# Patient Record
Sex: FEMALE | Race: BLACK OR AFRICAN AMERICAN | NOT HISPANIC OR LATINO | Employment: OTHER | URBAN - METROPOLITAN AREA
[De-identification: names, ages, dates, MRNs, and addresses within clinical notes are randomized per-mention and may not be internally consistent; named-entity substitution may affect disease eponyms.]

---

## 2017-01-11 ENCOUNTER — TELEPHONE (OUTPATIENT)
Dept: ORTHOPEDICS | Facility: CLINIC | Age: 52
End: 2017-01-11

## 2017-01-11 NOTE — TELEPHONE ENCOUNTER
Called patient moved appointment to [place]. On Friday, denied appointment with Dr. Reyes, due to proximity. Patient vocalized understanding.

## 2017-01-12 DIAGNOSIS — M25.562 ACUTE PAIN OF LEFT KNEE: Primary | ICD-10-CM

## 2017-01-13 ENCOUNTER — HOSPITAL ENCOUNTER (OUTPATIENT)
Dept: RADIOLOGY | Facility: HOSPITAL | Age: 52
Discharge: HOME | End: 2017-01-13
Attending: ORTHOPAEDIC SURGERY
Payer: MEDICARE

## 2017-01-13 ENCOUNTER — OFFICE VISIT (OUTPATIENT)
Dept: ORTHOPEDICS | Facility: CLINIC | Age: 52
End: 2017-01-13
Payer: MEDICARE

## 2017-01-13 VITALS
HEART RATE: 75 BPM | DIASTOLIC BLOOD PRESSURE: 81 MMHG | SYSTOLIC BLOOD PRESSURE: 128 MMHG | WEIGHT: 212.06 LBS | BODY MASS INDEX: 37.57 KG/M2 | RESPIRATION RATE: 16 BRPM | HEIGHT: 63 IN

## 2017-01-13 DIAGNOSIS — M25.562 CHRONIC PAIN OF LEFT KNEE: Primary | ICD-10-CM

## 2017-01-13 DIAGNOSIS — M25.562 ACUTE PAIN OF LEFT KNEE: ICD-10-CM

## 2017-01-13 DIAGNOSIS — G89.29 CHRONIC PAIN OF LEFT KNEE: Primary | ICD-10-CM

## 2017-01-13 PROCEDURE — 99999 PR PBB SHADOW E&M-EST. PATIENT-LVL IV: CPT | Mod: PBBFAC,,, | Performed by: PHYSICIAN ASSISTANT

## 2017-01-13 PROCEDURE — 99213 OFFICE O/P EST LOW 20 MIN: CPT | Mod: S$GLB,,, | Performed by: PHYSICIAN ASSISTANT

## 2017-01-13 PROCEDURE — 73562 X-RAY EXAM OF KNEE 3: CPT | Mod: 26,LT,, | Performed by: RADIOLOGY

## 2017-01-13 PROCEDURE — 1159F MED LIST DOCD IN RCRD: CPT | Mod: S$GLB,,, | Performed by: PHYSICIAN ASSISTANT

## 2017-01-13 RX ORDER — MELOXICAM 7.5 MG/1
7.5 TABLET ORAL DAILY PRN
COMMUNITY
Start: 2017-01-04 | End: 2017-11-20 | Stop reason: SDUPTHER

## 2017-01-13 RX ORDER — CYANOCOBALAMIN 1000 UG/ML
INJECTION, SOLUTION INTRAMUSCULAR; SUBCUTANEOUS
COMMUNITY
Start: 2016-11-02

## 2017-01-13 NOTE — PATIENT INSTRUCTIONS
En qué consiste real lesión de la médula hopson (LME)   Real lesión de la médula hopson (LME) puede reducir la sensación y el movimiento en ciertas partes del cuerpo. Cada LME es diferente, y el efecto también es distinto en cada persona. En esta hoja se describen algunos detalles básicos sobre los distintos niveles y grados de estas lesiones. Consulte con york médico para obtener más información sobre york jeffy particular.    Niveles de real lesión de la médula hopson  La columna vertebral tiene 33 vértebras agrupadas en cuatro secciones:  · Vértebras cervicales (C): En el lauryn  · Vértebras torácicas (T): En la parte superior de la espalda  · Vértebras lumbares (L): En la parte inferior de la espalda  · Vértebras sacras (S): En la pelvis  La médula hopson recorre el centro de las vértebras desde la base del cerebro hasta la parte inferior de la espalda y contiene muchos nervios que controlan el movimiento y la sensación en el cuerpo. Los nervios entran y salen de la médula hopson a través de los espacios entre las vértebras. Cada sección tiene el siguiente número de nervios:  · 8 nervios cervicales: C1 a C8  · 12 nervios torácicos: T1 a T12  · 5 nervios lumbares: L1 a L5  · 5 nervios sacros: S1 a S5  Real lesión de la médula hopson se identifica con la letra correspondiente a la sección donde se encuentra, y el número correspondiente al nervio lesionado. El nivel de la lesión determina qué partes del cuerpo están afectadas. En general, el movimiento y la sensación se pierden por debajo del nivel de la lesión.  Clasificación de la gravedad de la lesión  Real LME se considera completa cuando existe real pérdida total de movimiento y sensación por debajo del nivel de la lesión. Real LME se considera incompleta cuando existe real pérdida parcial de movimiento o sensación por debajo del nivel de la lesión. Para evaluar la gravedad de york lesión, el médico puede usar la escala de la MELLO (American Spinal Injury  Association). (El cuadro al final de madhavi folleto contiene un enlace al sitio de la JAIR.) En esta escala se asigna real letra (de la A a la E) para identificar el paulo de gravedad de la lesión. Madhavi paulo se basa en los resultados de varias pruebas de la función sensorial y motora. York médico podrá darle más información sobre el paulo de york lesión y la frecuencia con que deberán hacerle pruebas médicas.  Lo que puede esperar  Real persona con real LME tendrá distintos grados de sensación y movimiento en york cuerpo según cuál sea el nivel de gravedad de york lesión. Otras funciones corporales manny la respiración y el control de la vejiga también pueden verse afectadas. Algunas personas con real LME pueden ser capaces de realizar la mayoría de york cuidado personal y actividades cotidianas por sí solas, mientras que otras pueden necesitar ayuda con ciertas tareas manny vestirse, comer, manejar y moverse.  Cada persona es diferente  Recuerde que el efecto de estas lesiones varía de real persona a otra, aun cuando tengan el mismo nivel de lesión, porque depende de muchos factores. El paulo y severidad de la lesión, así manny las capacidades del paciente, pueden cambiar con el tiempo. Después del período de adaptación inicial, muchas personas con todo tipo de LME logran tener real buena calidad de iman. Hable con york médico y con york equipo de atención médica para informarse sobre los efectos que puede esperar de york lesión y establecer los objetivos más apropiados.  Recursos  · American Spinal Injury Association (JAIR)  www.jair-spinalinjury.org/publications/n_store.php  · The National Spinal Cord Injury Foundation  www.spinalcord.org  · [employer] Foundation  www.christopherreeve.org  · [employer] [place]  www.pva.org  · Spinal Cord Injury Information Network  www.spinalcord.[facility].edu   © 2003-9616 [employer]. [address] [place] [address], [place] [address]. Todos los [facility]. Esta  información no pretende sustituir la atención médica profesional. Sólo york médico puede diagnosticar y tratar un problema de sandra.        Osteoarthritis  Osteoarthritis (also called degenerative joint disease) happens when the cartilage in a joint becomes damaged and worn. This may be due to age, wear and tear, overuse of the joint, or other problems. Osteoarthritis can affect any joint. But it is most common in hands, knees, spine, hips, and feet. Symptoms include joint stiffness, pain, and swelling.  Home care  · When a joint is more sore than usual, rest it for a day or two.  · Heat can help relieve stiffness. Take a hot bath or apply a heating pad for up to 30 minutes at a time. If symptoms are worse in the morning, using heat just after awakening can help relax the muscle and soothe the joints.   · Ice helps relieve pain and swelling. It is often used after activity. Use a cold pack wrapped in a thin cloth on the joint for 10-15 minutes at a time.   · Alternating hot and cold can also help relieve pain. Try this for 20 minutes at a time, several times per day.  · Exercise helps prevent the muscles and ligaments around the joint from becoming weak. It also helps maintain function in the joint.  Be as active as you can. Talk to your doctor about what activity program is best for you.  · Excess weight puts a lot of extra strain on weight-bearing joints of the lower back, hips, knees, feet and ankles. If you are overweight, talk to your doctor about a safe and effective weight loss program.  · Use anti-inflammatory medications as prescribed for pain. This incudes acetaminophen or NSAIDs such as ibuprofen or naproxen. If needed, topical or injected medications may be recommended. Talk to your doctor if these options are not enough to manage your pain.  · Talk with your doctor about devices that might help improve your function and reduce pain.  Follow-up care  Follow up with your doctor as advised by our  staff.  When to seek medical attention  Call your doctor right away if any of the following occur:  · Redness or swelling of a painful joint  · Discharge or pus from a painful joint  · Fever of 100.4ºF (38ºC) or higher, or as directed by your healthcare provider  · Worsening joint pain  · Decreased ability to move the joint or bear weight on the joint  © 0009-3797 [employer]. [address] [place], [place] [address]. All rights reserved. This information is not intended as a substitute for professional medical care. Always follow your healthcare professional's instructions.    CONSIDER TAKING TUMERIC FOR PAIN.

## 2017-01-13 NOTE — PROGRESS NOTES
CC:52 y/o female, left knee pain for 3-4 months, right hip-knee for same.  Date of Surgery: 5/8/2013 left knee ATS    HPI:52 y/o left pops, locks on her, increase pain after standing too long. Has tried NSAIDS, PT. Right hip and knne increase pain after long standing, flexes her back to reduce pain. Currently being seen for back, sciatic and drop foot issues. ROD- pending. Left knee starting to effect ADL's    PMH:    Past Medical History   Diagnosis Date    Acute angina     DDD (degenerative disc disease)     DVT (deep venous thrombosis)     Fibromyalgia     Foot drop     GERD (gastroesophageal reflux disease)     Hyperlipemia     Hypertension     Mitral valve prolapse     Sciatic nerve injury     Spondylolisthesis        PSH:    Past Surgical History   Procedure Laterality Date    Knee arthroscopy      Tubal ligation      Hysterectomy      Lipoma resection      Cardiac catheterization         Family Hx:    Family History   Problem Relation Age of Onset    Heart disease Mother     Diabetes Mother     Anesthesia problems Neg Hx     Broken bones Neg Hx     Cancer Neg Hx     Clotting disorder Neg Hx     Collagen disease Neg Hx     Dislocations Neg Hx     Osteoporosis Neg Hx     Rheumatologic disease Neg Hx     Scoliosis Neg Hx     Severe sprains Neg Hx        Allergy:    Review of patient's allergies indicates:   Allergen Reactions    Sulfa (sulfonamide antibiotics) Hives    Tylox [oxycodone-acetaminophen] Hives    Daypro [oxaprozin] Hives       Medication:    Current Outpatient Prescriptions:     atorvastatin (LIPITOR) 10 MG tablet, Take 10 mg by mouth once daily., Disp: , Rfl:     clopidogrel (PLAVIX) 75 mg tablet, , Disp: , Rfl:     cyanocobalamin 1,000 mcg/mL injection, , Disp: , Rfl:     cyclobenzaprine (FLEXERIL) 5 MG tablet, Take 5 mg by mouth 3 (three) times daily as needed., Disp: , Rfl:     gabapentin (NEURONTIN) 300 MG capsule, , Disp: , Rfl:     meloxicam (MOBIC) 7.5 MG  "tablet, , Disp: , Rfl:     metoprolol tartrate (LOPRESSOR) 50 MG tablet, , Disp: , Rfl:     mometasone (NASONEX) 50 mcg/actuation nasal spray, 2 sprays by Nasal route once daily., Disp: , Rfl:     nitroGLYCERIN (NITROSTAT) 0.4 MG SL tablet, Place 0.4 mg under the tongue every 5 (five) minutes as needed., Disp: , Rfl:     tramadol (ULTRAM) 50 mg tablet, , Disp: , Rfl:     nabumetone (RELAFEN) 500 MG tablet, Take 1 tablet (500 mg total) by mouth 2 (two) times daily., Disp: 30 tablet, Rfl: 0    Social History:    Social History     Social History    Marital status:      Spouse name: N/A    Number of children: N/A    Years of education: N/A     Occupational History    Unemployed      Social History Main Topics    Smoking status: Never Smoker    Smokeless tobacco: Not on file    Alcohol use No    Drug use: No    Sexual activity: Not on file     Other Topics Concern    Not on file     Social History Narrative       Vitals:     Visit Vitals    /81 (BP Location: Right arm, Patient Position: Sitting, BP Method: Automatic)    Pulse 75    Resp 16    Ht 5' 3" (1.6 m)    Wt 96.2 kg (212 lb 1.3 oz)    BMI 37.57 kg/m2        ROS:  GENERAL: No fever, chills, fatigability or weight loss.  SKIN: No rashes, itching or changes in color or texture of skin.  HEAD: No headaches or recent head trauma.  EYES: Visual acuity fine. No photophobia, ocular pain or diplopia.  EARS: Denies ear pain, discharge or vertigo.  NOSE: No loss of smell, no epistaxis or postnasal drip.  MOUTH & THROAT: No hoarseness or change in voice. No excessive gum bleeding.  NODES: Denies swollen glands.  CHEST: Denies CUBA, cyanosis, wheezing, cough and sputum production.  CARDIOVASCULAR: Denies chest pain, PND, orthopnea or reduced exercise tolerance.  ABDOMEN: Appetite fine. No weight loss. Denies diarrhea, abdominal pain, hematemesis or blood in stool.  URINARY: No flank pain, dysuria or hematuria.  PERIPHERAL VASCULAR: No " claudication or cyanosis.  NEUROLOGIC: No history of seizures, paralysis, alteration of gait or coordination.  MUSCULOSKELETAL: See HPI    PE:  APPEARANCE: Well nourished, well developed, in no acute distress.   HEAD: Normocephalic, atraumatic.  SKIN:no redness or signs of infection.  NEUROLOGIC: Cranial Nerves: II-XII grossly intact, also see MUSCULOSKELETAL  MUSCULOSKELETAL: increase bi-lateral crepitus, left increase tender joint line, mild effusion, increase pain with extension.  Right hip- tender along bursa, down lateral thigh.  Sensory intact, cap refill less than 2 sec.    Assessment:           Diagnosis:              1.Left knee pain ( hx of surgery)               2. Right knee pain                   Diagnostic Studies  MRI-Yes, left knee, increase pain, hx of surgery  X-Ray-Yes, There is no radiographic evidence of acute osseous, articular, or soft tissue abnormality. Mild joint space loss present at the patellofemoral compartment with minimal osteophyte production.  EMG/NCV-No  Arthrogram-No  Bone Scan-No  CT Scan-No  Doppler-No  ESR-No  CRP-No  CBC with Diff-No   Rheumatoid/Arthritis Panel-No      Plan:                                                 1. PT-no                                                 2.OT-no                                          3.NSAID-yes   Continue Mobic                                     4. Narcotics-no                                     5. Wound care-N/A                                 6. Rest-no                                           7. Surgery-no                                         8. KOURTNEY Hose-no                                    9. Anticoagulation therapy-no               10. Elevation-no                                     11. Crutches-no                                    12. Walker-no             13. Cane no                        14. Referral-no                                     15.Injection-no                            16. Splint   /    Cast   /   Cast  Shoe-No              17. RICE            18. Follow up-   After MRI. MRI for left knee pain, failed better with NSAIDS and PT. Continue to follow up for possible spinal stenosis and sciatic issues as this could be causing most of her problems

## 2017-01-13 NOTE — MR AVS SNAPSHOT
[facility]  [address] [facility] [address]  Phone: [phone]  Fax: [phone]                  Suze FRIED Julio   2017 9:00 AM   Office Visit    Description:  Female : 1965   Provider:  Immanuel Estrada PA-C   Department:  [facility]           Reason for Visit     Right Knee - Pain     Left Knee - Pain           Diagnoses this Visit        Comments    Chronic pain of left knee    -  Primary            To Do List           Future Appointments        Provider Department Dept Phone    2017 9:30 AM [facility] [phone]    2/10/2017 9:15 AM Vincent Reyes Sr., MD [facility] [phone]      Goals (5 Years of Data)     None      Follow-Up and Disposition     Return for MRI Results.      [facility]     [facility] -  Assistance  Registered nurses in the [facility] provide clinical advisement, health education, appointment booking, and other advisory services.  Call for this free service at [phone].             Medications           Message regarding Medications     Verify the changes and/or additions to your medication regime listed below are the same as discussed with your clinician today.  If any of these changes or additions are incorrect, please notify your healthcare provider.        STOP taking these medications     hydrocodone-acetaminophen 7.5-325mg (NORCO) 7.5-325 mg per tablet Take 1 tablet by mouth every 6 (six) hours as needed.           Verify that the below list of medications is an accurate representation of the medications you are currently taking.  If none reported, the list may be blank. If incorrect, please contact your healthcare provider. Carry this list with you in case of emergency.           Current Medications     atorvastatin (LIPITOR) 10 MG tablet Take 10 mg by mouth once daily.    clopidogrel (PLAVIX) 75 mg tablet     cyanocobalamin 1,000 mcg/mL  "injection     cyclobenzaprine (FLEXERIL) 5 MG tablet Take 5 mg by mouth 3 (three) times daily as needed.    gabapentin (NEURONTIN) 300 MG capsule     meloxicam (MOBIC) 7.5 MG tablet     metoprolol tartrate (LOPRESSOR) 50 MG tablet     mometasone (NASONEX) 50 mcg/actuation nasal spray 2 sprays by Nasal route once daily.    nabumetone (RELAFEN) 500 MG tablet Take 1 tablet (500 mg total) by mouth 2 (two) times daily.    nitroGLYCERIN (NITROSTAT) 0.4 MG SL tablet Place 0.4 mg under the tongue every 5 (five) minutes as needed.    tramadol (ULTRAM) 50 mg tablet            Clinical Reference Information           Vital Signs - Last Recorded  Most recent update: 1/13/2017  9:44 AM by Ana Velazquez MA    BP Pulse Resp Ht Wt BMI    128/81 (BP Location: Right arm, Patient Position: Sitting, BP Method: Automatic) 75 16 5' 3" (1.6 m) 96.2 kg (212 lb 1.3 oz) 37.57 kg/m2      Blood Pressure          Most Recent Value    BP  128/81      Allergies as of 1/13/2017     Sulfa (Sulfonamide Antibiotics)    Tylox [Oxycodone-acetaminophen]    Daypro [Oxaprozin]      Immunizations Administered on Date of Encounter - 1/13/2017     None      Orders Placed During Today's Visit     Future Labs/Procedures Expected by Expires    MRI Lower Extremity Joint WO Cont Left  1/13/2017 1/13/2018      Instructions      En qué consiste real lesión de la médula hopson (LME)   Real lesión de la médula hopson (LME) puede reducir la sensación y el movimiento en ciertas partes del cuerpo. Cada LME es diferente, y el efecto también es distinto en cada persona. En esta hoja se describen algunos detalles básicos sobre los distintos niveles y grados de estas lesiones. Consulte con york médico para obtener más información sobre york jeffy particular.    Niveles de real lesión de la médula hopson  La columna vertebral tiene 33 vértebras agrupadas en cuatro secciones:  · Vértebras cervicales (C): En el lauryn  · Vértebras torácicas (T): En la parte superior de la " espalda  · Vértebras lumbares (L): En la parte inferior de la espalda  · Vértebras sacras (S): En la pelvis  La médula hopson recorre el centro de las vértebras desde la base del cerebro hasta la parte inferior de la espalda y contiene muchos nervios que controlan el movimiento y la sensación en el cuerpo. Los nervios entran y salen de la médula hopson a través de los espacios entre las vértebras. Cada sección tiene el siguiente número de nervios:  · 8 nervios cervicales: C1 a C8  · 12 nervios torácicos: T1 a T12  · 5 nervios lumbares: L1 a L5  · 5 nervios sacros: S1 a S5  Real lesión de la médula hopson se identifica con la letra correspondiente a la sección donde se encuentra, y el número correspondiente al nervio lesionado. El nivel de la lesión determina qué partes del cuerpo están afectadas. En general, el movimiento y la sensación se pierden por debajo del nivel de la lesión.  Clasificación de la gravedad de la lesión  Real LME se considera completa cuando existe real pérdida total de movimiento y sensación por debajo del nivel de la lesión. Real LME se considera incompleta cuando existe real pérdida parcial de movimiento o sensación por debajo del nivel de la lesión. Para evaluar la gravedad de york lesión, el médico puede usar la escala de la MELLO (American Spinal Injury Association). (El cuadro al final de madhavi folleto contiene un enlace al sitio de la MELLO.) En esta escala se asigna real letra (de la A a la E) para identificar el paulo de gravedad de la lesión. Madhavi paulo se basa en los resultados de varias pruebas de la función sensorial y motora. [place] médico podrá darle más información sobre el paulo de york lesión y la frecuencia con que deberán hacerle pruebas médicas.  Lo que puede esperar  Real persona con real LME tendrá distintos grados de sensación y movimiento en york cuerpo según cuál sea el nivel de gravedad de york lesión. Otras funciones corporales manny la respiración y el control de la vejiga también  pueden verse afectadas. Algunas personas con real LME pueden ser capaces de realizar la mayoría de york cuidado personal y actividades cotidianas por sí solas, mientras que otras pueden necesitar ayuda con ciertas tareas manny vestirse, comer, manejar y moverse.  Cada persona es diferente  Recuerde que el efecto de estas lesiones varía de real persona a otra, aun cuando tengan el mismo nivel de lesión, porque depende de muchos factores. El paulo y severidad de la lesión, así manny las capacidades del paciente, pueden cambiar con el tiempo. Después del período de adaptación inicial, muchas personas con todo tipo de LME logran tener real buena calidad de iman. Hable con york médico y con york equipo de atención médica para informarse sobre los efectos que puede esperar de york lesión y establecer los objetivos más apropiados.  Recursos  · American Spinal Injury Association (JAIR)  www.jair-spinalinjury.org/publications/n_store.php  · The National Spinal Cord Injury Foundation  www.spinalcord.org  · [employer]eve Foundation  www.christopherreeve.org  · [employer] [place]  www.pva.org  · Spinal Cord Injury Information Network  www.spinalcord.b.edu   © [id number] [employer]. [address] [place] [address], [place] [address]. Todos los derechos reservados. Esta información no pretende sustituir la atención médica profesional. Sólo york médico puede diagnosticar y tratar un problema de sandra.        Osteoarthritis  Osteoarthritis (also called degenerative joint disease) happens when the cartilage in a joint becomes damaged and worn. This may be due to age, wear and tear, overuse of the joint, or other problems. Osteoarthritis can affect any joint. But it is most common in hands, knees, spine, hips, and feet. Symptoms include joint stiffness, pain, and swelling.  Home care  · When a joint is more sore than usual, rest it for a day or two.  · Heat can help relieve stiffness. Take a hot bath or apply a  heating pad for up to 30 minutes at a time. If symptoms are worse in the morning, using heat just after awakening can help relax the muscle and soothe the joints.   · Ice helps relieve pain and swelling. It is often used after activity. Use a cold pack wrapped in a thin cloth on the joint for 10-15 minutes at a time.   · Alternating hot and cold can also help relieve pain. Try this for 20 minutes at a time, several times per day.  · Exercise helps prevent the muscles and ligaments around the joint from becoming weak. It also helps maintain function in the joint.  Be as active as you can. Talk to your doctor about what activity program is best for you.  · Excess weight puts a lot of extra strain on weight-bearing joints of the lower back, hips, knees, feet and ankles. If you are overweight, talk to your doctor about a safe and effective weight loss program.  · Use anti-inflammatory medications as prescribed for pain. This incudes acetaminophen or NSAIDs such as ibuprofen or naproxen. If needed, topical or injected medications may be recommended. Talk to your doctor if these options are not enough to manage your pain.  · Talk with your doctor about devices that might help improve your function and reduce pain.  Follow-up care  Follow up with your doctor as advised by our staff.  When to seek medical attention  Call your doctor right away if any of the following occur:  · Redness or swelling of a painful joint  · Discharge or pus from a painful joint  · Fever of 100.4ºF (38ºC) or higher, or as directed by your healthcare provider  · Worsening joint pain  · Decreased ability to move the joint or bear weight on the joint  © 0102-2760 The [employer], [employer]. [address] [place] [address], [place] [address]. All rights reserved. This information is not intended as a substitute for professional medical care. Always follow your healthcare professional's instructions.    CONSIDER TAKING TUMERIC FOR PAIN.

## 2017-01-27 ENCOUNTER — HOSPITAL ENCOUNTER (OUTPATIENT)
Dept: RADIOLOGY | Facility: HOSPITAL | Age: 52
Discharge: HOME | End: 2017-01-27
Attending: ORTHOPAEDIC SURGERY
Payer: MEDICARE

## 2017-01-27 DIAGNOSIS — M25.562 CHRONIC PAIN OF LEFT KNEE: ICD-10-CM

## 2017-01-27 DIAGNOSIS — G89.29 CHRONIC PAIN OF LEFT KNEE: ICD-10-CM

## 2017-01-27 PROCEDURE — 73721 MRI JNT OF LWR EXTRE W/O DYE: CPT | Mod: 26,LT,, | Performed by: RADIOLOGY

## 2017-01-27 PROCEDURE — 73721 MRI JNT OF LWR EXTRE W/O DYE: CPT | Mod: TC,PO,LT

## 2017-02-09 ENCOUNTER — TELEPHONE (OUTPATIENT)
Dept: ORTHOPEDICS | Facility: CLINIC | Age: 52
End: 2017-02-09

## 2017-02-09 NOTE — TELEPHONE ENCOUNTER
Called patient, moved appointment to Immanuel Estrada PA-C for MRI review on 02/14/2017. Dr. Reyes will not be available. Patient in agreement and vocalized understanding.

## 2017-02-14 ENCOUNTER — OFFICE VISIT (OUTPATIENT)
Dept: ORTHOPEDICS | Facility: CLINIC | Age: 52
End: 2017-02-14
Payer: MEDICARE

## 2017-02-14 VITALS
WEIGHT: 208.56 LBS | SYSTOLIC BLOOD PRESSURE: 116 MMHG | HEART RATE: 62 BPM | BODY MASS INDEX: 36.95 KG/M2 | DIASTOLIC BLOOD PRESSURE: 77 MMHG | HEIGHT: 63 IN

## 2017-02-14 DIAGNOSIS — M25.562 ACUTE PAIN OF LEFT KNEE: Primary | ICD-10-CM

## 2017-02-14 PROCEDURE — 99999 PR PBB SHADOW E&M-EST. PATIENT-LVL III: CPT | Mod: PBBFAC,,, | Performed by: PHYSICIAN ASSISTANT

## 2017-02-14 PROCEDURE — 99213 OFFICE O/P EST LOW 20 MIN: CPT | Mod: S$GLB,,, | Performed by: PHYSICIAN ASSISTANT

## 2017-02-14 RX ORDER — OSELTAMIVIR PHOSPHATE 75 MG
CAPSULE ORAL
Refills: 0 | COMMUNITY
Start: 2017-02-11 | End: 2017-04-21

## 2017-02-14 RX ORDER — CHLOPHEDIANOL HYDROCHLORIDE, DEXBROMPHENIRAMINE MALEATE, PSEUDOEPHEDRINE HYDROCHLORIDE 12.5; 1; 3 MG/5ML; MG/5ML; MG/5ML
LIQUID ORAL
Refills: 0 | COMMUNITY
Start: 2017-02-11 | End: 2017-05-08

## 2017-02-14 NOTE — MR AVS SNAPSHOT
[facility]  [address] [facility] [place] [place] [address]  Phone: [phone]  Fax: [phone]                  Suze FRIED Julio   2017 11:15 AM   Office Visit    Description:  Female : 1965   Provider:  Immanuel Estrada PA-C   Department:  [facility]           Reason for Visit     Left Knee - Pain     Results           Diagnoses this Visit        Comments    Acute pain of left knee    -  Primary            To Do List           Future Appointments        Provider Department Dept Phone    2017 8:30 [phone]    2017 8:45 AM [facility] XR2 [facility] [phone]    2017 9:00 AM SPECIMEN, [facility] [phone]    2017 9:15 AM LABORATORY, [facility] [phone]    2017 9:45 AM ORTHOPEDICS NURSE, [facility] [phone]      Goals (5 Years of Data)     None      Follow-Up and Disposition     Return if symptoms worsen or fail to improve.    Follow-up and Disposition History      [facility]     [facility] -  Assistance  Registered nurses in the [facility] provide clinical advisement, health education, appointment booking, and other advisory services.  Call for this free service at [phone].             Medications           Message regarding Medications     Verify the changes and/or additions to your medication regime listed below are the same as discussed with your clinician today.  If any of these changes or additions are incorrect, please notify your healthcare provider.             Verify that the below list of medications is an accurate representation of the medications you are currently taking.  If none reported, the list may be blank. If incorrect, please contact your healthcare provider. Carry this list with you in case of emergency.           Current Medications     atorvastatin (LIPITOR) 10 MG  "tablet Take 10 mg by mouth once daily.    CHLO TUSS 1-30-12.5 mg/5 mL Liqd TK 5 ML PO QID PRF COUGH/CONGESTION    clopidogrel (PLAVIX) 75 mg tablet     cyanocobalamin 1,000 mcg/mL injection     cyclobenzaprine (FLEXERIL) 5 MG tablet Take 5 mg by mouth 3 (three) times daily as needed.    gabapentin (NEURONTIN) 300 MG capsule     meloxicam (MOBIC) 7.5 MG tablet     metoprolol tartrate (LOPRESSOR) 50 MG tablet     mometasone (NASONEX) 50 mcg/actuation nasal spray 2 sprays by Nasal route once daily.    nabumetone (RELAFEN) 500 MG tablet Take 1 tablet (500 mg total) by mouth 2 (two) times daily.    nitroGLYCERIN (NITROSTAT) 0.4 MG SL tablet Place 0.4 mg under the tongue every 5 (five) minutes as needed.    TAMIFLU 75 mg capsule TK 1 C PO BID FOR 5 DAYS    tramadol (ULTRAM) 50 mg tablet            Clinical Reference Information           Your Vitals Were     BP Pulse Height Weight BMI    116/77 62 5' 3" (1.6 m) 94.6 kg (208 lb 8.9 oz) 36.94 kg/m2      Blood Pressure          Most Recent Value    BP  116/77      Allergies as of 2/14/2017     Sulfa (Sulfonamide Antibiotics)    Tylox [Oxycodone-acetaminophen]    Daypro [Oxaprozin]      Immunizations Administered on Date of Encounter - 2/14/2017     None      Instructions      Knee Arthroscopy  Knee problems can often be diagnosed and treated with a technique called arthroscopy. This type of surgery is done using an instrument called an arthroscope (scope). Only a few small incisions are needed for this surgery. The procedure can be used to diagnose a knee problem. In many cases, treatment can also be done using arthroscopy.     Insertion of fluid, arthroscope, and instruments through small incisions (portals).         Patient undergoes procedure      The arthroscope  The scope allows the doctor to look directly into the knee joint. It is about the size of a pencil and contains a pathway for fluids. It also contains coated glass fibers that beam an intense, cool light into " the knee joint. A camera is attached to the scope as well. It provides clear images of most areas in your knee joint. The doctor views these images on a monitor.  Preparing for the procedure  · Have lab or other testing done as advised.  · Tell your doctor about any medicines or supplements you take  · Do not eat or drink anything for 10 hours before the procedure.  · Once you arrive for surgery, you will be given an IV line in your arm or hand. This provides fluids and medicines.  · To keep you free of pain during the surgery, youll receive medicine called anesthesia. You may have:  ¨ General anesthesia. This puts you into a deep sleep during the surgery.  ¨ Regional anesthesia. This numbs the body from the waist down.  ¨ Local anesthesia. This numbs just the knee.  In addition to regional or local anesthesia, you may receive sedation. This medicine makes you relaxed and sleepy during the surgery.  The procedure  · A few small incisions (portals) are made in your knee.  · The scope is inserted through one of the portals.  · Sterile fluid is put into the knee joint. This makes it easier to see and work inside your joint.  · Using the scope, the doctor confirms the type and degree of knee damage. If possible, the problem is treated at this time. This is done using surgical tools put through the other portals.  · When the surgery is done, all tools are removed. The incisions are closed with sutures, staples, surgical glue, or strips of surgical tape.  Risks and complications of arthroscopy  All surgeries have risks. The risks of arthroscopy include:  · Bleeding  · Infection  · Blood clots  · Swelling and stiffness of the knee  · Injury to normal tissue  · Continuing knee problems   Date Last Reviewed: 9/20/2015 © 2000-2016 [employer]. [address] [place] [address]. All rights reserved. This information is not intended as a substitute for professional medical care. Always follow your  healthcare professional's instructions.             Language Assistance Services     ATTENTION: Language assistance services are available, free of charge. Please call [phone].      ATENCIÓN: Si habla louise, tiene a york disposición servicios gratuitos de asistencia lingüística. Llame al [phone].     CHÚ Ý: N?u b?n nói Ti?ng Vi?t, có các d?ch v? h? tr? ngôn ng? mi?n phí dành cho b?n. G?i s? [phone].         [facility] complies with applicable Federal civil rights laws and does not discriminate on the basis of race, color, national origin, age, disability, or sex.

## 2017-02-14 NOTE — PATIENT INSTRUCTIONS
Knee Arthroscopy  Knee problems can often be diagnosed and treated with a technique called arthroscopy. This type of surgery is done using an instrument called an arthroscope (scope). Only a few small incisions are needed for this surgery. The procedure can be used to diagnose a knee problem. In many cases, treatment can also be done using arthroscopy.     Insertion of fluid, arthroscope, and instruments through small incisions (portals).         Patient undergoes procedure      The arthroscope  The scope allows the doctor to look directly into the knee joint. It is about the size of a pencil and contains a pathway for fluids. It also contains coated glass fibers that beam an intense, cool light into the knee joint. A camera is attached to the scope as well. It provides clear images of most areas in your knee joint. The doctor views these images on a monitor.  Preparing for the procedure  · Have lab or other testing done as advised.  · Tell your doctor about any medicines or supplements you take  · Do not eat or drink anything for 10 hours before the procedure.  · Once you arrive for surgery, you will be given an IV line in your arm or hand. This provides fluids and medicines.  · To keep you free of pain during the surgery, youll receive medicine called anesthesia. You may have:  ¨ General anesthesia. This puts you into a deep sleep during the surgery.  ¨ Regional anesthesia. This numbs the body from the waist down.  ¨ Local anesthesia. This numbs just the knee.  In addition to regional or local anesthesia, you may receive sedation. This medicine makes you relaxed and sleepy during the surgery.  The procedure  · A few small incisions (portals) are made in your knee.  · The scope is inserted through one of the portals.  · Sterile fluid is put into the knee joint. This makes it easier to see and work inside your joint.  · Using the scope, the doctor confirms the type and degree of knee damage. If possible, the  problem is treated at this time. This is done using surgical tools put through the other portals.  · When the surgery is done, all tools are removed. The incisions are closed with sutures, staples, surgical glue, or strips of surgical tape.  Risks and complications of arthroscopy  All surgeries have risks. The risks of arthroscopy include:  · Bleeding  · Infection  · Blood clots  · Swelling and stiffness of the knee  · Injury to normal tissue  · Continuing knee problems   Date Last Reviewed: 9/20/2015 © 2000-2016 [employer]. [address] [place] [address], [place] [address]. All rights reserved. This information is not intended as a substitute for professional medical care. Always follow your healthcare professional's instructions.

## 2017-02-14 NOTE — PROGRESS NOTES
CC:52 y/o female, left knee pain for 3-4 months, right hip-knee for same.    Date of Surgery: 5/8/2013 left knee ATS    HPI:52 y/o left pops, locks on her, increase pain after standing too long. Has tried NSAIDS, PT. Right hip and knne increase pain after long standing, flexes her back to reduce pain. Currently being seen for back, sciatic and drop foot issues. ROD- pending. Left knee starting to effect ADL's    PMH:    Past Medical History   Diagnosis Date    Acute angina     DDD (degenerative disc disease)     DVT (deep venous thrombosis)     Fibromyalgia     Foot drop     GERD (gastroesophageal reflux disease)     Hyperlipemia     Hypertension     Mitral valve prolapse     Sciatic nerve injury     Spondylolisthesis        PSH:    Past Surgical History   Procedure Laterality Date    Knee arthroscopy      Tubal ligation      Hysterectomy      Lipoma resection      Cardiac catheterization         Family Hx:    Family History   Problem Relation Age of Onset    Heart disease Mother     Diabetes Mother     Anesthesia problems Neg Hx     Broken bones Neg Hx     Cancer Neg Hx     Clotting disorder Neg Hx     Collagen disease Neg Hx     Dislocations Neg Hx     Osteoporosis Neg Hx     Rheumatologic disease Neg Hx     Scoliosis Neg Hx     Severe sprains Neg Hx        Allergy:    Review of patient's allergies indicates:   Allergen Reactions    Sulfa (sulfonamide antibiotics) Hives    Tylox [oxycodone-acetaminophen] Hives    Daypro [oxaprozin] Hives       Medication:    Current Outpatient Prescriptions:     atorvastatin (LIPITOR) 10 MG tablet, Take 10 mg by mouth once daily., Disp: , Rfl:     CHLO TUSS 1-30-12.5 mg/5 mL Liqd, TK 5 ML PO QID PRF COUGH/CONGESTION, Disp: , Rfl: 0    clopidogrel (PLAVIX) 75 mg tablet, , Disp: , Rfl:     cyanocobalamin 1,000 mcg/mL injection, , Disp: , Rfl:     cyclobenzaprine (FLEXERIL) 5 MG tablet, Take 5 mg by mouth 3 (three) times daily as needed., Disp: ,  "Rfl:     gabapentin (NEURONTIN) 300 MG capsule, , Disp: , Rfl:     meloxicam (MOBIC) 7.5 MG tablet, , Disp: , Rfl:     metoprolol tartrate (LOPRESSOR) 50 MG tablet, , Disp: , Rfl:     mometasone (NASONEX) 50 mcg/actuation nasal spray, 2 sprays by Nasal route once daily., Disp: , Rfl:     nabumetone (RELAFEN) 500 MG tablet, Take 1 tablet (500 mg total) by mouth 2 (two) times daily., Disp: 30 tablet, Rfl: 0    nitroGLYCERIN (NITROSTAT) 0.4 MG SL tablet, Place 0.4 mg under the tongue every 5 (five) minutes as needed., Disp: , Rfl:     TAMIFLU 75 mg capsule, TK 1 C PO BID FOR 5 DAYS, Disp: , Rfl: 0    tramadol (ULTRAM) 50 mg tablet, , Disp: , Rfl:     Social History:    Social History     Social History    Marital status:      Spouse name: N/A    Number of children: N/A    Years of education: N/A     Occupational History    Unemployed      Social History Main Topics    Smoking status: Never Smoker    Smokeless tobacco: Never Used    Alcohol use No    Drug use: No    Sexual activity: Not on file     Other Topics Concern    Not on file     Social History Narrative       Vitals:     Visit Vitals    /77    Pulse 62    Ht 5' 3" (1.6 m)    Wt 94.6 kg (208 lb 8.9 oz)    BMI 36.94 kg/m2        ROS:  GENERAL: No fever, chills, fatigability or weight loss.  SKIN: No rashes, itching or changes in color or texture of skin.  HEAD: No headaches or recent head trauma.  EYES: Visual acuity fine. No photophobia, ocular pain or diplopia.  EARS: Denies ear pain, discharge or vertigo.  NOSE: No loss of smell, no epistaxis or postnasal drip.  MOUTH & THROAT: No hoarseness or change in voice. No excessive gum bleeding.  NODES: Denies swollen glands.  CHEST: Denies CUBA, cyanosis, wheezing, cough and sputum production.  CARDIOVASCULAR: Denies chest pain, PND, orthopnea or reduced exercise tolerance.  ABDOMEN: Appetite fine. No weight loss. Denies diarrhea, abdominal pain, hematemesis or blood in " stool.  URINARY: No flank pain, dysuria or hematuria.  PERIPHERAL VASCULAR: No claudication or cyanosis.  NEUROLOGIC: No history of seizures, paralysis, alteration of gait or coordination.  MUSCULOSKELETAL: See HPI    PE:  APPEARANCE: Well nourished, well developed, in no acute distress.   HEAD: Normocephalic, atraumatic.  SKIN:no redness or signs of infection.  NEUROLOGIC: Cranial Nerves: II-XII grossly intact, also see MUSCULOSKELETAL  MUSCULOSKELETAL: increase bi-lateral crepitus, left increase tender joint line, mild effusion, increase pain with extension.  Right hip- tender along bursa, down lateral thigh.  Sensory intact, cap refill less than 2 sec.    Assessment:           Diagnosis:              1.Left knee pain ( hx of surgery)               2. Right knee pain                   Diagnostic Studies  MRI-Yes,agree with the findings of Degenerative change in the patellofemoral joint and medial tibiofemoral joint. Subchondral cyst formation in the lateral aspect medial femoral condyle. Clinically exclude early osteochondritis dissecans.    X-Ray-Yes, There is no radiographic evidence of acute osseous, articular, or soft tissue abnormality. Mild joint space loss present at the patellofemoral compartment with minimal osteophyte production.  EMG/NCV-No  Arthrogram-No  Bone Scan-No  CT Scan-No  Doppler-No  ESR-No  CRP-No  CBC with Diff-No   Rheumatoid/Arthritis Panel-No      Plan:                                                 1. PT-no                                                 2.OT-no                                          3.NSAID-yes   Continue Mobic                                     4. Narcotics-no                                     5. Wound care-N/A                                 6. Rest-no                                           7. Surgery- left knee ATS                                       8. KOURTNEY Hose-no                                    9. Anticoagulation therapy-no               10.  Elevation-no                                     11. Crutches-no                                    12. Walker-no             13. Cane no                        14. Referral-no                                     15.Injection-no                            16. Splint   /    Cast   /   Cast Shoe-No              17. RICE            18. Follow up-   all the patient's questions and concerns were answered by myself and Dr Reyes.  Patient wishes to proceed with a left knee arthroscope and decreased pain, increased range of motion and improved quality of life.  She also wear that she still may have some pain after the procedure.

## 2017-02-15 DIAGNOSIS — M25.562 CHRONIC PAIN OF LEFT KNEE: Primary | ICD-10-CM

## 2017-02-15 DIAGNOSIS — G89.29 CHRONIC PAIN OF LEFT KNEE: Primary | ICD-10-CM

## 2017-02-15 DIAGNOSIS — Z01.818 PRE-OP TESTING: ICD-10-CM

## 2017-04-17 ENCOUNTER — CLINICAL SUPPORT (OUTPATIENT)
Dept: CARDIOLOGY | Facility: CLINIC | Age: 52
End: 2017-04-17
Payer: MEDICARE

## 2017-04-17 ENCOUNTER — HOSPITAL ENCOUNTER (OUTPATIENT)
Dept: RADIOLOGY | Facility: HOSPITAL | Age: 52
Discharge: HOME | End: 2017-04-17
Attending: ORTHOPAEDIC SURGERY
Payer: MEDICARE

## 2017-04-17 DIAGNOSIS — Z01.818 PRE-OP TESTING: ICD-10-CM

## 2017-04-17 PROCEDURE — 93000 ELECTROCARDIOGRAM COMPLETE: CPT | Mod: S$GLB,,, | Performed by: NUCLEAR MEDICINE

## 2017-04-17 PROCEDURE — 71020 XR CHEST PA AND LATERAL PRE-OP: CPT | Mod: 26,,, | Performed by: RADIOLOGY

## 2017-04-21 ENCOUNTER — OFFICE VISIT (OUTPATIENT)
Dept: INTERNAL MEDICINE | Facility: CLINIC | Age: 52
End: 2017-04-21
Payer: MEDICARE

## 2017-04-21 VITALS
HEART RATE: 82 BPM | HEIGHT: 63 IN | SYSTOLIC BLOOD PRESSURE: 120 MMHG | DIASTOLIC BLOOD PRESSURE: 80 MMHG | BODY MASS INDEX: 36.75 KG/M2 | TEMPERATURE: 97 F | WEIGHT: 207.44 LBS

## 2017-04-21 DIAGNOSIS — G57.02 SCIATIC NERVE DISEASE, LEFT: ICD-10-CM

## 2017-04-21 DIAGNOSIS — Z98.890 HISTORY OF LEFT KNEE SURGERY: ICD-10-CM

## 2017-04-21 DIAGNOSIS — M47.26 OSTEOARTHRITIS OF SPINE WITH RADICULOPATHY, LUMBAR REGION: ICD-10-CM

## 2017-04-21 DIAGNOSIS — M17.12 ARTHRITIS OF LEFT KNEE: ICD-10-CM

## 2017-04-21 DIAGNOSIS — Z01.818 PRE-OP EXAM: Primary | ICD-10-CM

## 2017-04-21 DIAGNOSIS — M25.562 ACUTE PAIN OF LEFT KNEE: ICD-10-CM

## 2017-04-21 PROCEDURE — 99214 OFFICE O/P EST MOD 30 MIN: CPT | Mod: S$GLB,,, | Performed by: NURSE PRACTITIONER

## 2017-04-21 PROCEDURE — 99999 PR PBB SHADOW E&M-EST. PATIENT-LVL III: CPT | Mod: PBBFAC,,, | Performed by: NURSE PRACTITIONER

## 2017-04-21 PROCEDURE — 1160F RVW MEDS BY RX/DR IN RCRD: CPT | Mod: S$GLB,,, | Performed by: NURSE PRACTITIONER

## 2017-04-21 RX ORDER — LISINOPRIL 10 MG/1
10 TABLET ORAL NIGHTLY
COMMUNITY
End: 2017-10-11 | Stop reason: SDUPTHER

## 2017-04-21 RX ORDER — TRAMADOL HYDROCHLORIDE 50 MG/1
50 TABLET ORAL EVERY 8 HOURS PRN
Qty: 30 TABLET | Refills: 0 | Status: SHIPPED | OUTPATIENT
Start: 2017-04-21

## 2017-04-21 NOTE — PROGRESS NOTES
Subjective:       Patient ID: Suze Kim is a 51 y.o. female.    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 Reyes wants to stop plavix   She denies fever, sweats, chills, rash, sob, cp, headaches, blurred vision, n/v/d, dizziness, or any other acute complaints.  HTN- controlled        Past Surgical History:  No date: CARDIAC CATHETERIZATION  No date: HYSTERECTOMY  No date: KNEE ARTHROSCOPY  No date: LIPOMA RESECTION  No date: TUBAL LIGATION        Past Medical History:  No date: Acute angina--sees Dr. Fajardo- will be seen by him next week before surgery  No date: DDD (degenerative disc disease)  No date: DVT (deep venous thrombosis)  No date: Fibromyalgia  No date: Foot drop  No date: GERD (gastroesophageal reflux disease)  No date: Hyperlipemia  No date: Hypertension  No date: Mitral valve prolapse  No date: Sciatic nerve injury  No date: Spondylolisthesis      Current Outpatient Prescriptions:     lisinopril 10 MG tablet, Take 10 mg by mouth once daily., Disp: , Rfl:     OMEGA-3S/DHA/EPA/FISH OIL/D3 (VITAMIN-D + OMEGA-3 ORAL), Take by mouth., Disp: , Rfl:     atorvastatin (LIPITOR) 10 MG tablet, Take 10 mg by mouth once daily., Disp: , Rfl:     CHLO TUSS 1-30-12.5 mg/5 mL Liqd, TK 5 ML PO QID PRF COUGH/CONGESTION, Disp: , Rfl: 0    clopidogrel (PLAVIX) 75 mg tablet, , Disp: , Rfl:     cyanocobalamin 1,000 mcg/mL injection, , Disp: , Rfl:     cyclobenzaprine (FLEXERIL) 5 MG tablet, Take 5 mg by mouth 3 (three) times daily as needed., Disp: , Rfl:     gabapentin (NEURONTIN) 300 MG capsule, , Disp: , Rfl:     meloxicam (MOBIC) 7.5 MG tablet, , Disp: , Rfl:     metoprolol tartrate (LOPRESSOR) 50 MG tablet, , Disp: , Rfl:     mometasone (NASONEX) 50  mcg/actuation nasal spray, 2 sprays by Nasal route once daily., Disp: , Rfl:     nabumetone (RELAFEN) 500 MG tablet, Take 1 tablet (500 mg total) by mouth 2 (two) times daily., Disp: 30 tablet, Rfl: 0    nitroGLYCERIN (NITROSTAT) 0.4 MG SL tablet, Place 0.4 mg under the tongue every 5 (five) minutes as needed., Disp: , Rfl:     tramadol (ULTRAM) 50 mg tablet, Take 1 tablet (50 mg total) by mouth every 8 (eight) hours as needed for Pain., Disp: 30 tablet, Rfl: 0      Medication Refill   Associated symptoms include arthralgias (left knee pain). Pertinent negatives include no abdominal pain, chest pain, chills, congestion, coughing, fatigue, fever, headaches, nausea, sore throat or vomiting.     Review of Systems   Constitutional: Negative for chills, fatigue and fever.   HENT: Negative for congestion, ear pain, postnasal drip, rhinorrhea, sinus pressure, sneezing and sore throat.    Eyes: Negative for photophobia, pain and visual disturbance.   Respiratory: Negative for cough, chest tightness and shortness of breath.    Cardiovascular: Negative for chest pain, palpitations and leg swelling.   Gastrointestinal: Negative for abdominal pain, constipation, diarrhea, nausea and vomiting.   Genitourinary: Negative for dysuria and frequency.   Musculoskeletal: Positive for arthralgias (left knee pain) and back pain.        Ambulates with cane    Neurological: Negative for dizziness, light-headedness and headaches.   Psychiatric/Behavioral: Negative for sleep disturbance.       Objective:      Physical Exam   Constitutional: She is oriented to person, place, and time. She appears well-developed and well-nourished.   HENT:   Head: Normocephalic and atraumatic.   Right Ear: Tympanic membrane normal.   Left Ear: Tympanic membrane normal.   Eyes: Conjunctivae and EOM are normal. Pupils are equal, round, and reactive to light.   Neck: Normal range of motion. Neck supple.   Cardiovascular: Normal rate, regular rhythm, normal  heart sounds and intact distal pulses.    Pulmonary/Chest: Effort normal and breath sounds normal.   Abdominal: Soft. Bowel sounds are normal.   Musculoskeletal:        Left knee: She exhibits decreased range of motion. She exhibits no swelling.        Lumbar back: She exhibits decreased range of motion and pain.   Neurological: She is alert and oriented to person, place, and time.   Skin: Skin is warm and dry.   Psychiatric: She has a normal mood and affect.       Assessment:       1. Pre-op exam    2. Acute pain of left knee    3. Arthritis of left knee    4. History of left knee surgery    5. Osteoarthritis of spine with radiculopathy, lumbar region    6. Sciatic nerve disease, left        Plan:   Pre-op exam    Acute pain of left knee  Comments:  acute on chronic  Orders:  -     tramadol (ULTRAM) 50 mg tablet; Take 1 tablet (50 mg total) by mouth every 8 (eight) hours as needed for Pain.  Dispense: 30 tablet; Refill: 0    Arthritis of left knee    History of left knee surgery    Osteoarthritis of spine with radiculopathy, lumbar region    Sciatic nerve disease, left      Pt is cleared from internal medicine perspective for low risk of pre-operative complications.   Will see Cardiology next for cardiac clearance  Stop plavix according to Dr. Reyes preference  A few Tramadol given until surgery

## 2017-04-21 NOTE — LETTER
April 21, 2017      Vincent Reyes Sr., MD  [address] [place] [address]           [facility]  [address] [place] [address]  Phone: [phone]  Fax: [phone]          Patient: Suze Kim   MR Number: [id number]   YOB: 1965   Date of Visit: 4/21/2017       Dear Dr. Vincent Reyes Sr.:    Thank you for referring Suze Kim to me for evaluation. Attached you will find relevant portions of my assessment and plan of care.    If you have questions, please do not hesitate to call me. I look forward to following Suze Kim along with you.    Sincerely,    Dione Cordero, HANK    Enclosure  CC:  No Recipients    If you would like to receive this communication electronically, please contact [email or web address] or [phone] to request more information on [employer] access.    For providers and/or their staff who would like to refer a patient to [facility], please contact us through our one-stop-shop provider referral line, [facility], at [phone].    If you feel you have received this communication in error or would no longer like to receive these types of communications, please e-mail [email or web address]

## 2017-04-21 NOTE — PATIENT INSTRUCTIONS
Pt is cleared from internal medicine perspective for low risk of pre-operative complications.   Will see Cardiology next for cardiac clearance  Stop plavix according to Dr. Reyes preference

## 2017-04-21 NOTE — MR AVS SNAPSHOT
[facility] Medicine  [address] [facility] [address]  Phone: [phone]  Fax: [phone]                  Suze FRIED Julio   2017 1:00 PM   Office Visit    Description:  Female : 1965   Provider:  Dione Cordero NP   Department:  [facility] - Internal Medicine           Reason for Visit     Pre-op Exam     Medication Refill           Diagnoses this Visit        Comments    Pre-op exam    -  Primary     Acute pain of left knee     acute on chronic    Arthritis of left knee         History of left knee surgery         Osteoarthritis of spine with radiculopathy, lumbar region         Sciatic nerve disease, left                To Do List           Future Appointments        Provider Department Dept Phone    2017 2:15 PM Vincent Reyes Sr., MD [facility] [phone]      Your Future Surgeries/Procedures     May 10, 2017   Surgery with Vincent Reyes Sr., MD   [facility]  ([facility])    [address] [facility] [address] [phone]              Goals (5 Years of Data)     None       These Medications        Disp Refills Start End    tramadol (ULTRAM) 50 mg tablet 30 tablet 0 2017     Take 1 tablet (50 mg total) by mouth every 8 (eight) hours as needed for Pain. - Oral    Pharmacy: [facility] [address] [place] [address] Ph #: [phone]         [facility]     [facility] Care [facility] -  Assistance  Unless otherwise directed by your provider, please contact [facility], our nurse care line that is available for / assistance.     Registered nurses in the [facility] provide: appointment scheduling, clinical advisement, health education, and other advisory services.  Call: [phone] (toll free)               Medications           Message regarding Medications     Verify the changes and/or additions to your medication regime listed below are  "the same as discussed with your clinician today.  If any of these changes or additions are incorrect, please notify your healthcare provider.        CHANGE how you are taking these medications     Start Taking Instead of    tramadol (ULTRAM) 50 mg tablet tramadol (ULTRAM) 50 mg tablet    Dosage:  Take 1 tablet (50 mg total) by mouth every 8 (eight) hours as needed for Pain.     Reason for Change:  Reorder       STOP taking these medications     TAMIFLU 75 mg capsule TK 1 C PO BID FOR 5 DAYS           Verify that the below list of medications is an accurate representation of the medications you are currently taking.  If none reported, the list may be blank. If incorrect, please contact your healthcare provider. Carry this list with you in case of emergency.           Current Medications     lisinopril 10 MG tablet Take 10 mg by mouth once daily.    OMEGA-3S/DHA/EPA/FISH OIL/D3 (VITAMIN-D + OMEGA-3 ORAL) Take by mouth.    atorvastatin (LIPITOR) 10 MG tablet Take 10 mg by mouth once daily.    CHLO TUSS 1-30-12.5 mg/5 mL Liqd TK 5 ML PO QID PRF COUGH/CONGESTION    clopidogrel (PLAVIX) 75 mg tablet     cyanocobalamin 1,000 mcg/mL injection     cyclobenzaprine (FLEXERIL) 5 MG tablet Take 5 mg by mouth 3 (three) times daily as needed.    gabapentin (NEURONTIN) 300 MG capsule     meloxicam (MOBIC) 7.5 MG tablet     metoprolol tartrate (LOPRESSOR) 50 MG tablet     mometasone (NASONEX) 50 mcg/actuation nasal spray 2 sprays by Nasal route once daily.    nabumetone (RELAFEN) 500 MG tablet Take 1 tablet (500 mg total) by mouth 2 (two) times daily.    nitroGLYCERIN (NITROSTAT) 0.4 MG SL tablet Place 0.4 mg under the tongue every 5 (five) minutes as needed.    tramadol (ULTRAM) 50 mg tablet Take 1 tablet (50 mg total) by mouth every 8 (eight) hours as needed for Pain.           Clinical Reference Information           Your Vitals Were     BP Pulse Temp Height Weight BMI    120/80 82 96.9 °F (36.1 °C) (Tympanic) 5' 3" (1.6 m) 94.1 " kg (207 lb 7.3 oz) 36.75 kg/m2      Blood Pressure          Most Recent Value    BP  120/80      Allergies as of 4/21/2017     Sulfa (Sulfonamide Antibiotics)    Tylox [Oxycodone-acetaminophen]    Daypro [Oxaprozin]      Immunizations Administered on Date of Encounter - 4/21/2017     None      Instructions      Pt is cleared from internal medicine perspective for low risk of pre-operative complications.   Will see Cardiology next for cardiac clearance  Stop plavix according to Dr. Reyes preference       Language Assistance Services     ATTENTION: Language assistance services are available, free of charge. Please call [phone].      ATENCIÓN: Si habla español, tiene a [place] disposición servicios gratuitos de asistencia lingüística. Llame al [phone].     CHÚ Ý: N?u b?n nói Ti?ng Vi?t, có các d?ch v? h? tr? ngôn ng? mi?n phí dành cho b?n. G?i s? [phone].         [facility] complies with applicable Federal civil rights laws and does not discriminate on the basis of race, color, national origin, age, disability, or sex.

## 2017-04-27 DIAGNOSIS — Z98.890 POST-OPERATIVE STATE: Primary | ICD-10-CM

## 2017-05-09 ENCOUNTER — ANESTHESIA EVENT (OUTPATIENT)
Dept: SURGERY | Facility: HOSPITAL | Age: 52
End: 2017-05-09
Payer: MEDICARE

## 2017-05-10 ENCOUNTER — TELEPHONE (OUTPATIENT)
Dept: ORTHOPEDICS | Facility: CLINIC | Age: 52
End: 2017-05-10

## 2017-05-10 ENCOUNTER — ANESTHESIA (OUTPATIENT)
Dept: SURGERY | Facility: HOSPITAL | Age: 52
End: 2017-05-10
Payer: MEDICARE

## 2017-05-10 ENCOUNTER — HOSPITAL ENCOUNTER (OUTPATIENT)
Facility: HOSPITAL | Age: 52
Discharge: HOME | End: 2017-05-10
Attending: ORTHOPAEDIC SURGERY | Admitting: ORTHOPAEDIC SURGERY
Payer: MEDICARE

## 2017-05-10 VITALS
WEIGHT: 207.88 LBS | DIASTOLIC BLOOD PRESSURE: 88 MMHG | OXYGEN SATURATION: 98 % | BODY MASS INDEX: 35.49 KG/M2 | TEMPERATURE: 98 F | HEART RATE: 64 BPM | HEIGHT: 64 IN | SYSTOLIC BLOOD PRESSURE: 136 MMHG | RESPIRATION RATE: 19 BRPM

## 2017-05-10 DIAGNOSIS — M94.261 CHONDROMALACIA OF RIGHT KNEE: ICD-10-CM

## 2017-05-10 DIAGNOSIS — G89.18 POST-OP PAIN: ICD-10-CM

## 2017-05-10 DIAGNOSIS — Z98.890 HISTORY OF LEFT KNEE SURGERY: Primary | ICD-10-CM

## 2017-05-10 DIAGNOSIS — M17.12 ARTHRITIS OF LEFT KNEE: ICD-10-CM

## 2017-05-10 DIAGNOSIS — M67.361 TRANSIENT SYNOVITIS OF RIGHT KNEE: ICD-10-CM

## 2017-05-10 PROCEDURE — 37000009 HC ANESTHESIA EA ADD 15 MINS: Performed by: ORTHOPAEDIC SURGERY

## 2017-05-10 PROCEDURE — 36000711: Performed by: ORTHOPAEDIC SURGERY

## 2017-05-10 PROCEDURE — 25000003 PHARM REV CODE 250: Performed by: ANESTHESIOLOGY

## 2017-05-10 PROCEDURE — 25000003 PHARM REV CODE 250: Performed by: NURSE ANESTHETIST, CERTIFIED REGISTERED

## 2017-05-10 PROCEDURE — 29880 ARTHRS KNE SRG MNISECTMY M&L: CPT | Mod: LT,,, | Performed by: ORTHOPAEDIC SURGERY

## 2017-05-10 PROCEDURE — 63600175 PHARM REV CODE 636 W HCPCS: Performed by: NURSE ANESTHETIST, CERTIFIED REGISTERED

## 2017-05-10 PROCEDURE — 27201423 OPTIME MED/SURG SUP & DEVICES STERILE SUPPLY: Performed by: ORTHOPAEDIC SURGERY

## 2017-05-10 PROCEDURE — 36000710: Performed by: ORTHOPAEDIC SURGERY

## 2017-05-10 PROCEDURE — 71000039 HC RECOVERY, EACH ADD'L HOUR: Performed by: ORTHOPAEDIC SURGERY

## 2017-05-10 PROCEDURE — 29880 ARTHRS KNE SRG MNISECTMY M&L: CPT | Mod: AS,LT,, | Performed by: PHYSICIAN ASSISTANT

## 2017-05-10 PROCEDURE — 63600175 PHARM REV CODE 636 W HCPCS: Performed by: ANESTHESIOLOGY

## 2017-05-10 PROCEDURE — 37000008 HC ANESTHESIA 1ST 15 MINUTES: Performed by: ORTHOPAEDIC SURGERY

## 2017-05-10 PROCEDURE — 25000003 PHARM REV CODE 250: Performed by: ORTHOPAEDIC SURGERY

## 2017-05-10 PROCEDURE — 63600175 PHARM REV CODE 636 W HCPCS: Performed by: ORTHOPAEDIC SURGERY

## 2017-05-10 PROCEDURE — 71000015 HC POSTOP RECOV 1ST HR: Performed by: ORTHOPAEDIC SURGERY

## 2017-05-10 PROCEDURE — 71000033 HC RECOVERY, INTIAL HOUR: Performed by: ORTHOPAEDIC SURGERY

## 2017-05-10 RX ORDER — PROMETHAZINE HYDROCHLORIDE 25 MG/ML
6.25 INJECTION, SOLUTION INTRAMUSCULAR; INTRAVENOUS EVERY 6 HOURS PRN
Status: DISCONTINUED | OUTPATIENT
Start: 2017-05-10 | End: 2017-05-10 | Stop reason: HOSPADM

## 2017-05-10 RX ORDER — ONDANSETRON 2 MG/ML
4 INJECTION INTRAMUSCULAR; INTRAVENOUS DAILY PRN
Status: DISCONTINUED | OUTPATIENT
Start: 2017-05-10 | End: 2017-05-10 | Stop reason: HOSPADM

## 2017-05-10 RX ORDER — SODIUM CHLORIDE, SODIUM LACTATE, POTASSIUM CHLORIDE, CALCIUM CHLORIDE 600; 310; 30; 20 MG/100ML; MG/100ML; MG/100ML; MG/100ML
INJECTION, SOLUTION INTRAVENOUS CONTINUOUS
Status: DISCONTINUED | OUTPATIENT
Start: 2017-05-10 | End: 2017-05-10 | Stop reason: HOSPADM

## 2017-05-10 RX ORDER — FENTANYL CITRATE 50 UG/ML
INJECTION, SOLUTION INTRAMUSCULAR; INTRAVENOUS
Status: DISCONTINUED | OUTPATIENT
Start: 2017-05-10 | End: 2017-05-10

## 2017-05-10 RX ORDER — LIDOCAINE HYDROCHLORIDE 20 MG/ML
JELLY TOPICAL
Status: DISCONTINUED | OUTPATIENT
Start: 2017-05-10 | End: 2017-05-10

## 2017-05-10 RX ORDER — CEFAZOLIN SODIUM 1 G/3ML
INJECTION, POWDER, FOR SOLUTION INTRAMUSCULAR; INTRAVENOUS
Status: DISCONTINUED | OUTPATIENT
Start: 2017-05-10 | End: 2017-05-10

## 2017-05-10 RX ORDER — MIDAZOLAM HYDROCHLORIDE 1 MG/ML
INJECTION, SOLUTION INTRAMUSCULAR; INTRAVENOUS
Status: DISCONTINUED | OUTPATIENT
Start: 2017-05-10 | End: 2017-05-10

## 2017-05-10 RX ORDER — MORPHINE SULFATE 10 MG/ML
2 INJECTION INTRAMUSCULAR; INTRAVENOUS; SUBCUTANEOUS EVERY 5 MIN PRN
Status: DISCONTINUED | OUTPATIENT
Start: 2017-05-10 | End: 2017-05-10 | Stop reason: HOSPADM

## 2017-05-10 RX ORDER — DIPHENHYDRAMINE HYDROCHLORIDE 50 MG/ML
25 INJECTION INTRAMUSCULAR; INTRAVENOUS EVERY 6 HOURS PRN
Status: DISCONTINUED | OUTPATIENT
Start: 2017-05-10 | End: 2017-05-10 | Stop reason: HOSPADM

## 2017-05-10 RX ORDER — HYDROCODONE BITARTRATE AND ACETAMINOPHEN 5; 325 MG/1; MG/1
1 TABLET ORAL EVERY 4 HOURS PRN
Status: DISCONTINUED | OUTPATIENT
Start: 2017-05-10 | End: 2017-05-10 | Stop reason: HOSPADM

## 2017-05-10 RX ORDER — SUCCINYLCHOLINE CHLORIDE 20 MG/ML
INJECTION INTRAMUSCULAR; INTRAVENOUS
Status: DISCONTINUED | OUTPATIENT
Start: 2017-05-10 | End: 2017-05-10

## 2017-05-10 RX ORDER — ROCURONIUM BROMIDE 10 MG/ML
INJECTION, SOLUTION INTRAVENOUS
Status: DISCONTINUED | OUTPATIENT
Start: 2017-05-10 | End: 2017-05-10

## 2017-05-10 RX ORDER — HYDROMORPHONE HYDROCHLORIDE 2 MG/ML
0.2 INJECTION, SOLUTION INTRAMUSCULAR; INTRAVENOUS; SUBCUTANEOUS EVERY 5 MIN PRN
Status: DISCONTINUED | OUTPATIENT
Start: 2017-05-10 | End: 2017-05-10 | Stop reason: HOSPADM

## 2017-05-10 RX ORDER — PROPOFOL 10 MG/ML
VIAL (ML) INTRAVENOUS
Status: DISCONTINUED | OUTPATIENT
Start: 2017-05-10 | End: 2017-05-10

## 2017-05-10 RX ORDER — BUPIVACAINE HYDROCHLORIDE 2.5 MG/ML
INJECTION, SOLUTION EPIDURAL; INFILTRATION; INTRACAUDAL
Status: DISCONTINUED | OUTPATIENT
Start: 2017-05-10 | End: 2017-05-10 | Stop reason: HOSPADM

## 2017-05-10 RX ORDER — ONDANSETRON 2 MG/ML
INJECTION INTRAMUSCULAR; INTRAVENOUS
Status: DISCONTINUED | OUTPATIENT
Start: 2017-05-10 | End: 2017-05-10

## 2017-05-10 RX ORDER — CEFAZOLIN SODIUM 1 G/50ML
2 SOLUTION INTRAVENOUS
Status: DISCONTINUED | OUTPATIENT
Start: 2017-05-10 | End: 2017-05-10 | Stop reason: HOSPADM

## 2017-05-10 RX ORDER — SODIUM CHLORIDE 9 MG/ML
3 INJECTION, SOLUTION INTRAMUSCULAR; INTRAVENOUS; SUBCUTANEOUS
Status: DISCONTINUED | OUTPATIENT
Start: 2017-05-10 | End: 2017-05-10 | Stop reason: HOSPADM

## 2017-05-10 RX ORDER — MEPERIDINE HYDROCHLORIDE 50 MG/ML
12.5 INJECTION INTRAMUSCULAR; INTRAVENOUS; SUBCUTANEOUS ONCE AS NEEDED
Status: COMPLETED | OUTPATIENT
Start: 2017-05-10 | End: 2017-05-10

## 2017-05-10 RX ORDER — MUPIROCIN 20 MG/G
1 OINTMENT TOPICAL
Status: DISCONTINUED | OUTPATIENT
Start: 2017-05-10 | End: 2017-05-10 | Stop reason: HOSPADM

## 2017-05-10 RX ORDER — LIDOCAINE HYDROCHLORIDE 20 MG/ML
INJECTION, SOLUTION EPIDURAL; INFILTRATION; INTRACAUDAL; PERINEURAL
Status: DISCONTINUED | OUTPATIENT
Start: 2017-05-10 | End: 2017-05-10

## 2017-05-10 RX ORDER — MEPERIDINE HYDROCHLORIDE 50 MG/1
50 TABLET ORAL EVERY 4 HOURS PRN
Qty: 60 TABLET | Refills: 0 | Status: SHIPPED | OUTPATIENT
Start: 2017-05-10 | End: 2018-01-30

## 2017-05-10 RX ORDER — KETOROLAC TROMETHAMINE 30 MG/ML
INJECTION, SOLUTION INTRAMUSCULAR; INTRAVENOUS
Status: DISCONTINUED | OUTPATIENT
Start: 2017-05-10 | End: 2017-05-10

## 2017-05-10 RX ORDER — LIDOCAINE HYDROCHLORIDE 10 MG/ML
INJECTION, SOLUTION EPIDURAL; INFILTRATION; INTRACAUDAL; PERINEURAL
Status: DISCONTINUED | OUTPATIENT
Start: 2017-05-10 | End: 2017-05-10 | Stop reason: HOSPADM

## 2017-05-10 RX ADMIN — MEPERIDINE HYDROCHLORIDE 12.5 MG: 50 INJECTION INTRAMUSCULAR; INTRAVENOUS; SUBCUTANEOUS at 11:05

## 2017-05-10 RX ADMIN — LIDOCAINE HYDROCHLORIDE 40 MG: 20 INJECTION, SOLUTION EPIDURAL; INFILTRATION; INTRACAUDAL; PERINEURAL at 09:05

## 2017-05-10 RX ADMIN — PROPOFOL 150 MG: 10 INJECTION, EMULSION INTRAVENOUS at 09:05

## 2017-05-10 RX ADMIN — KETOROLAC TROMETHAMINE 30 MG: 30 INJECTION, SOLUTION INTRAMUSCULAR; INTRAVENOUS at 10:05

## 2017-05-10 RX ADMIN — MORPHINE SULFATE 2 MG: 10 INJECTION, SOLUTION INTRAMUSCULAR; INTRAVENOUS at 11:05

## 2017-05-10 RX ADMIN — EPHEDRINE SULFATE 20 MG: 50 INJECTION, SOLUTION INTRAMUSCULAR; INTRAVENOUS; SUBCUTANEOUS at 09:05

## 2017-05-10 RX ADMIN — EPHEDRINE SULFATE 10 MG: 50 INJECTION, SOLUTION INTRAMUSCULAR; INTRAVENOUS; SUBCUTANEOUS at 09:05

## 2017-05-10 RX ADMIN — CEFAZOLIN SODIUM 1 G: 1 SOLUTION INTRAVENOUS at 09:05

## 2017-05-10 RX ADMIN — HYDROMORPHONE HYDROCHLORIDE 0.2 MG: 2 INJECTION, SOLUTION INTRAMUSCULAR; INTRAVENOUS; SUBCUTANEOUS at 11:05

## 2017-05-10 RX ADMIN — MORPHINE SULFATE 2 MG: 10 INJECTION, SOLUTION INTRAMUSCULAR; INTRAVENOUS at 12:05

## 2017-05-10 RX ADMIN — HYDROCODONE BITARTRATE AND ACETAMINOPHEN 1 TABLET: 5; 325 TABLET ORAL at 11:05

## 2017-05-10 RX ADMIN — ONDANSETRON 4 MG: 2 INJECTION INTRAMUSCULAR; INTRAVENOUS at 11:05

## 2017-05-10 RX ADMIN — CEFAZOLIN 1 G: 1 INJECTION, POWDER, FOR SOLUTION INTRAMUSCULAR; INTRAVENOUS at 09:05

## 2017-05-10 RX ADMIN — ONDANSETRON 4 MG: 2 INJECTION, SOLUTION INTRAMUSCULAR; INTRAVENOUS at 10:05

## 2017-05-10 RX ADMIN — FENTANYL CITRATE 100 MCG: 50 INJECTION, SOLUTION INTRAMUSCULAR; INTRAVENOUS at 09:05

## 2017-05-10 RX ADMIN — ROCURONIUM BROMIDE 5 MG: 10 INJECTION, SOLUTION INTRAVENOUS at 09:05

## 2017-05-10 RX ADMIN — SODIUM CHLORIDE, SODIUM LACTATE, POTASSIUM CHLORIDE, AND CALCIUM CHLORIDE: 600; 310; 30; 20 INJECTION, SOLUTION INTRAVENOUS at 09:05

## 2017-05-10 RX ADMIN — MIDAZOLAM HYDROCHLORIDE 2 MG: 1 INJECTION, SOLUTION INTRAMUSCULAR; INTRAVENOUS at 09:05

## 2017-05-10 RX ADMIN — SODIUM CHLORIDE, SODIUM LACTATE, POTASSIUM CHLORIDE, AND CALCIUM CHLORIDE: 600; 310; 30; 20 INJECTION, SOLUTION INTRAVENOUS at 10:05

## 2017-05-10 RX ADMIN — SUCCINYLCHOLINE CHLORIDE 120 MG: 20 INJECTION, SOLUTION INTRAMUSCULAR; INTRAVENOUS at 09:05

## 2017-05-10 RX ADMIN — LIDOCAINE HYDROCHLORIDE 1 EACH: 20 JELLY TOPICAL at 09:05

## 2017-05-10 NOTE — IP AVS SNAPSHOT
[address] [place] [address]           Patient Discharge Instructions   Our goal is to set you up for success. This packet includes information on your condition, medications, and your home care.  It will help you care for yourself to prevent having to return to the hospital.     Please ask your nurse if you have any questions.      There are many details to remember when preparing to leave the hospital. Here is what you will need to do:    1. Take your medicine. If you are prescribed medications, review your Medication List on the following pages. You may have new medications to  at the pharmacy and others that you'll need to stop taking. Review the instructions for how and when to take your medications. Talk with your doctor or nurses if you are unsure of what to do.     2. Go to your follow-up appointments. Specific follow-up information is listed in the following pages. Your may be contacted by a nurse or clinical provider about future appointments. Be sure we have all of the phone numbers to reach you. Please contact your provider's office if you are unable to make an appointment.     3. Watch for warning signs. Your doctor or nurse will give you detailed warning signs to watch for and when to call for assistance. These instructions may also include educational information about your condition. If you experience any of warning signs to your health, call your doctor.               ** Verify the list of medication(s) below is accurate and up to date. Carry this with you in case of emergency. If your medications have changed, please notify your healthcare provider.             Medication List      CONTINUE taking these medications        Additional Info                      atorvastatin 10 MG tablet   Commonly known as:  LIPITOR   Refills:  0   Dose:  10 mg    Instructions:  Take 10 mg by mouth once daily.     Begin Date    AM    Noon    PM    Bedtime        clopidogrel 75 mg tablet   Commonly known as:  PLAVIX   Refills:  0      Begin Date    AM    Noon    PM    Bedtime       cyanocobalamin 1,000 mcg/mL injection   Refills:  0   Indications:  twice a month      Begin Date    AM    Noon    PM    Bedtime       cyclobenzaprine 5 MG tablet   Commonly known as:  FLEXERIL   Refills:  0   Dose:  5 mg    Instructions:  Take 5 mg by mouth 3 (three) times daily as needed.     Begin Date    AM    Noon    PM    Bedtime       gabapentin 300 MG capsule   Commonly known as:  NEURONTIN   Refills:  0   Dose:  300 mg    Instructions:  300 mg 2 (two) times daily as needed.     Begin Date    AM    Noon    PM    Bedtime       lisinopril 10 MG tablet   Refills:  0   Dose:  10 mg    Instructions:  Take 10 mg by mouth every evening.     Begin Date    AM    Noon    PM    Bedtime       meloxicam 7.5 MG tablet   Commonly known as:  MOBIC   Refills:  0      Begin Date    AM    Noon    PM    Bedtime       metoprolol tartrate 50 MG tablet   Commonly known as:  LOPRESSOR   Refills:  0   Dose:  50 mg    Instructions:  50 mg 2 (two) times daily.     Begin Date    AM    Noon    PM    Bedtime       mometasone 50 mcg/actuation nasal spray   Commonly known as:  NASONEX   Refills:  0   Dose:  2 spray    Instructions:  2 sprays by Nasal route once daily.     Begin Date    AM    Noon    PM    Bedtime       nabumetone 500 MG tablet   Commonly known as:  RELAFEN   Quantity:  30 tablet   Refills:  0   Dose:  500 mg   Comments:  Patient has taken this medication before and did not have a reaction    Instructions:  Take 1 tablet (500 mg total) by mouth 2 (two) times daily.     Begin Date    AM    Noon    PM    Bedtime       nitroGLYCERIN 0.4 MG SL tablet   Commonly known as:  NITROSTAT   Refills:  0   Dose:  0.4 mg    Instructions:  Place 0.4 mg under the tongue every 5 (five) minutes as needed.     Begin Date    AM    Noon    PM    Bedtime       tramadol 50 mg tablet   Commonly known as:  ULTRAM   Quantity:  30  tablet   Refills:  0   Dose:  50 mg    Instructions:  Take 1 tablet (50 mg total) by mouth every 8 (eight) hours as needed for Pain.     Begin Date    AM    Noon    PM    Bedtime       VITAMIN-D + OMEGA-3 ORAL   Refills:  0    Instructions:  Take by mouth once a week.     Begin Date    AM    Noon    PM    Bedtime         ASK your doctor about these medications        Additional Info                      meperidine 50 mg tablet   Commonly known as:  DEMEROL   Quantity:  60 tablet   Refills:  0   Dose:  50 mg    Instructions:  Take 1 tablet (50 mg total) by mouth every 4 (four) hours as needed for Pain.     Begin Date    AM    Noon    PM    Bedtime            Where to Get Your Medications      You can get these medications from any pharmacy     Bring a paper prescription for each of these medications     meperidine 50 mg tablet                  Please bring to all follow up appointments:    1. A copy of your discharge instructions.  2. All medicines you are currently taking in their original bottles.  3. Identification and insurance card.    Please arrive 15 minutes ahead of scheduled appointment time.    Please call 24 hours in advance if you must reschedule your appointment and/or time.        Your Scheduled Appointments     May 15, 2017  9:00 AM CDT   New Physical Therapy Patient with CHARITY Mcleod   [facility]    [id number] [place] [address] Drive [address]  [facility] [address]   [phone]            May 23, 2017  2:15 PM CDT   Post OP with Vincent Reyes Sr., MD   [facility] ([facility])    [address] [facility] [place] [address] [phone]              Follow-up Information     Follow up with Vincent Reyes Sr, MD.    Specialty:  Orthopedic Surgery    Why:  As needed, If symptoms worsen, For suture removal, For wound re-check    Contact information:    [address]  [place] [address]  [phone]        Referrals     Future Orders    Referral to  Physical therapy     Questions:    Post Surgical?:  Yes    Eval and Treat:  Yes    Duration:  60 days    Frequency (times per week):  Three    Precautions:      Location:  Knees    OT Location:      Restore Functional ADL Training?:      Gait Training:      Therapeutic Exercises:  Passive    Active    Resistive    Wound Care:      Traction:      Electric Stimulation:  Yes    IONTO.:  No    U/S:  Yes    Developmental Stimulation?:      Specialty Programs:          Discharge Instructions     Future Orders    Activity as tolerated     Call MD for:  difficulty breathing, headache or visual disturbances     Call MD for:  extreme fatigue     Call MD for:  hives     Call MD for:  persistent dizziness or light-headedness     Call MD for:  persistent nausea and vomiting     Call MD for:  redness, tenderness, or signs of infection (pain, swelling, redness, odor or green/yellow discharge around incision site)     Call MD for:  severe uncontrolled pain     Call MD for:  temperature >100.4     Diet general     Questions:    Total calories:      Fat restriction, if any:      Protein restriction, if any:      Na restriction, if any:      Fluid restriction:      Additional restrictions:      Remove dressing in 48 hours     Scheduling Instructions:    Apply Betadine, gauze and an Ace wrap daily, after the first 48 hours.        Discharge Instructions         General Information:    1.  Do not drink alcoholic beverages including beer for 24 hours or as long as you are on pain medication..  2.  Do not drive a motor vehicle, operate machinery or power tools, or signs legal papers for 24 hours or as long as you are on pain medication.   3.  You may experience light-headedness, dizziness, and sleepiness following surgery. Please do not stay alone. A responsible adult should be with you for this 24 hour period.  4.  Go home and rest.    5. Progress slowly to a normal diet unless instructed.  Otherwise, begin with liquids such as soft  drinks, then soup and crackers working up to solid foods. Drink plenty of nonalcoholic fluids.  6.  Certain anesthetics and pain medications produce nausea and vomiting in certain       individuals. If nausea becomes a problem at home, call you doctor.    7. A nurse will be calling you sometime after surgery. Do not be alarmed. This is our way of finding out how you are doing.    8. Several times every hour while you are awake, take 2-3 deep breaths and cough. If you had stomach surgery hold a pillow or rolled towel firmly against your stomach before you cough. This will help with any pain the cough might cause.  9. Several times every hour while you are awake, pump and flex your feet 5-6 times and do foot circles. This will help prevent blood clots.    10.Call your doctor for severe pain, bleeding, fever, or signs or symptoms of infection (pain, swelling, redness, foul odor, drainage).    11.You can contact your doctor anytime by [phone] for the [facility] (at [facility]) or [phone] for the [facility] on [address].   [email or web address] is another way to contact your doctor if you are an active participant online with [facility].        Discharge Instructions for Knee Arthroscopy  You had knee arthroscopy. This surgical procedure uses small incisions to locate, identify, and treat problems inside the knee. These problems include loose bodies, meniscal tears, bone spurs, osteochondritis dissecans (OCD), and synovitis. Below are tips to help speed your recovery from surgery.  Activity  · Dont drive until your doctor says its OK. And never drive while taking opioid pain medicine.  · Remember to take pain medicines as directed; dont wait for the pain to get bad. And don't drink alcohol while taking pain medicines.  · Follow weight-bearing instructions given by your doctor. He or she may require you to use crutches to keep weight off your knee.  · Unless your doctor tells you otherwise,  begin using the affected knee as much as you can tolerate 3 days after surgery.  · Slowly bend and straighten your affected leg as far as you can, unless your doctor tells you otherwise. Do this several times a day.  · Rest your knee by lying down and putting pillows under it for the first 3 days after surgery. Keep your ankle elevated above the level of your heart. This helps keep swelling down.  · Follow your doctors instructions about wearing and caring for a brace, immobilizer, or elastic dressing.  · Point and flex your foot, and rotate your ankle as much as possible during the first few weeks following surgery. Also, wiggle your toes as much as possible.  Incision care  · Check your incision daily for redness, tenderness, or drainage.  · Dont be alarmed if there is some bruising, slight swelling of the knee, or a small amount of blood on the bandage.  · Adjust the bandage or brace as needed. It should feel supportive on your knee, but not too tight.   · Dont soak your incision in water (no hot tubs, bathtubs, swimming pools) until your doctor says its OK.  · Wait 2 day(s) after your surgery to begin showering. Then shower as needed. Cover your knee with plastic to keep the dressing or brace dry. Once your dressing is removed, follow your doctors instructions for care of the wound. And sit on a shower stool so that you dont fall while showering.  · Use an ice pack or bag of frozen peas--or something similar--wrapped in a thin towel to reduce the swelling. Keep the foot elevated while you ice the knee. Apply the ice pack for 20 minutes; then remove it for 20 minutes. Repeat as needed. Icing helps reduce swelling.  Other precautions  · Arrange your household to keep the items you need within reach.  · Remove throw rugs, electrical cords, and anything else that may cause you to fall.  · Use nonslip bath mats, grab bars, an elevated toilet seat, and a shower chair in your bathroom.  · Use a cane, crutches, a  walker, or handrails until your balance, flexibility, and strength improve, and you can put weight on your leg. And remember to ask for help from others when you need it.  · Free up your hands so that you can use them to keep balance. Use a jesus pack, apron, or pockets to carry things.  Follow-up  Make a follow-up appointment as directed by your doctor.     When to seek medical attention  Call 911 right away if you have any of the following:  · Chest pain  · Shortness of breath  · Severe nausea  Otherwise, call your doctor immediately if you have any of the following:  · Pain that is not relieved by medicine or rest  · Continued bleeding through the bandage  · Tingling, numbness, or coldness in your foot or leg  · Fever above 100.4°F (38.0°C) or shaking chills  · Excessive swelling, increased redness, or any drainage around the incision  · Swelling, tenderness, or pain in your leg      Date Last Reviewed: 11/16/2015  © 7291-0418 [employer]. [address] [place] [address]. All rights reserved. This information is not intended as a substitute for professional medical care. Always follow your healthcare professional's instructions.        Meperidine tablets  What is this medicine?  MEPERIDINE (me PER i dawna) is a pain reliever. It is used to treat moderate to severe pain.  How should I use this medicine?  Take this medicine by mouth with a full glass of water. Follow the directions on the prescription label. Take your medicine at regular intervals. Do not take your medicine more often than directed.  A special MedGuide will be given to you by the pharmacist with each prescription and refill. Be sure to read this information carefully each time.  Talk to your pediatrician regarding the use of this medicine in children. While this drug may be prescribed for selected conditions, precautions do apply.  What side effects may I notice from receiving this medicine?  Side effects that you should  report to your doctor or health care professional as soon as possible:  · allergic reactions like skin rash, itching or hives, swelling of the face, lips, or tongue  · breathing problems  · confusion  · seizures  · signs and symptoms of low blood pressure like dizziness; feeling faint or lightheaded, falls; unusually weak or tired  · trouble passing urine or change in the amount of urine  Side effects that usually do not require medical attention (report to your doctor or health care professional if they continue or are bothersome):  · constipation  · dry mouth  · nausea, vomiting  · tiredness  What may interact with this medicine?  Do not take this medicine with any of the following medications:  · MAOIs like Carbex, Eldepryl, Marplan, Nardil, and Parnate  · procarbazine  · ritonavir  This medicine may also interact with the following medications:  · acyclovir  · alcohol  · antihistamines for allergy, cough and cold  · certain medicines for anxiety or sleep  · certain medicines for depression like amitriptyline, fluoxetine, sertraline  · certain medicines for seizures like phenobarbital, phenytoin, primidone  · cimetidine  · furazolidone  · general anesthetics like halothane, isoflurane, methoxyflurane, propofol  · linezolid  · local anesthetics like lidocaine, pramoxine, tetracaine  · medicines that relax muscles for surgery  · other narcotic medicines for pain or cough  · phenothiazines like chlorpromazine, mesoridazine, prochlorperazine, thioridazine  · valacyclovir  What if I miss a dose?  If you miss a dose, take it as soon as you can. If it is almost time for your next dose, take only that dose. Do not take double or extra doses.  Where should I keep my medicine?  Keep out of the reach of children. This medicine can be abused. Keep your medicine in a safe place to protect it from theft. Do not share this medicine with anyone. Selling or giving away this medicine is dangerous and against the law.  Store at  room temperature between 15 and 30 degrees C (59 and 86 degrees F). Keep container tightly closed.  This medicine may cause accidental overdose and death if it is taken by other adults, children, or pets. Flush any unused medicine down the toilet to reduce the chance of harm. Do not use the medicine after the expiration date.  What should I tell my health care provider before I take this medicine?  They need to know if you have any of these conditions:  · brain tumor  · drug abuse or addiction  · head injury  · heart disease  · kidney disease  · liver disease  · lung disease or breathing difficulties, including asthma  · seizures  · use within 14 days of a MAOI like Carbex, Eldepryl, Marplan, Nardil, and Parnate  · an unusual or allergic reaction to meperidine or other opiate drugs, other medicines, foods, dyes, or preservatives  · pregnant or trying to get pregnant  · breast-feeding  What should I watch for while using this medicine?  Tell your doctor or health care professional if your pain does not go away, if it gets worse, or if you have new or a different type of pain. You may develop tolerance to the medicine. Tolerance means that you will need a higher dose of the medicine for pain relief. Tolerance is normal and is expected if you take the medicine for a long time.  Do not suddenly stop taking your medicine because you may develop a severe reaction. Your body becomes used to the medicine. This does NOT mean you are addicted. Addiction is a behavior related to getting and using a drug for a non-medical reason. If you have pain, you have a medical reason to take pain medicine. Your doctor will tell you how much medicine to take. If your doctor wants you to stop the medicine, the dose will be slowly lowered over time to avoid any side effects.  There are different types of narcotic medicines (opiates). If you take more than one type at the same time or if you are taking another medicine that also causes  "drowsiness, you may have more side effects. Give your health care provider a list of all medicines you use. Your doctor will tell you how much medicine to take. Do not take more medicine than directed. Call emergency for help if you have problems breathing or unusual sleepiness.  You may get drowsy or dizzy. Do not drive, use machinery, or do anything that needs mental alertness until you know how this medicine affects you. Do not stand or sit up quickly, especially if you are an older patient. This reduces the risk of dizzy or fainting spells. Alcohol may interfere with the effect of this medicine. Avoid alcoholic drinks.  The medicine will cause constipation. Try to have a bowel movement at least every 2 to 3 days. If you do not have a bowel movement for 3 days, call your doctor or health care professional.  Your mouth may get dry. Chewing sugarless gum or sucking hard candy, and drinking plenty of water may help. Contact your doctor if the problem does not go away or is severe.  Date Last Reviewed:   NOTE:This sheet is a summary. It may not cover all possible information. If you have questions about this medicine, talk to your doctor, pharmacist, or health care provider. Copyright© 2016 [employer]            Primary Diagnosis     Your primary diagnosis was:  History Of Left Knee Surgery      Admission Information     Date & Time Provider Department CSN    5/10/2017  7:33 AM Vincent Reyes Sr., MD [facility] [id number]      Care Providers     Provider Role Specialty Primary office phone    Vincent Reyes Sr., MD Attending Provider Orthopedic Surgery [phone]    Vincent Reyes Sr., MD Surgeon  Orthopedic Surgery [phone]      Your Vitals Were     BP Pulse Temp Resp Height Weight    148/79 69 97.2 °F (36.2 °C) (Temporal) 18 5' 4" (1.626 m) 94.3 kg (207 lb 14.3 oz)    SpO2 BMI             99% 35.68 kg/m2         Recent Lab Values     No lab values to display.      Allergies as of 5/10/2017  "       Reactions    Sulfa (Sulfonamide Antibiotics) Hives    Tylox [Oxycodone-acetaminophen] Hives    Daypro [Oxaprozin] Hives      [facility]     [facility] Care [facility] - 24/7 Assistance  Unless otherwise directed by your provider, please contact [facility], our nurse care line that is available for 24/7 assistance.     Registered nurses in the [facility] provide clinical advisement, health education, appointment booking, and other advisory services.  Call for this free service at [phone].        Advance Directives     An advance directive is a document which, in the event you are no longer able to make decisions for yourself, tells your healthcare team what kind of treatment you do or do not want to receive, or who you would like to make those decisions for you.  If you do not currently have an advance directive, [facility] encourages you to create one.  For more information call:  [phone]), [phone] ([phone]),  or log on to [email or web address].        Language Assistance Services     ATTENTION: Language assistance services are available, free of charge. Please call [phone].      ATENCIÓN: Si habla español, tiene a york disposición servicios gratuitos de asistencia lingüística. Llame al [phone].     CHÚ Ý: N?u b?n nói Ti?ng Vi?t, có các d?ch v? h? tr? ngôn ng? mi?n phí dành cho b?n. G?i s? [phone].         [facility]  complies with applicable Federal civil rights laws and does not discriminate on the basis of race, color, national origin, age, disability, or sex.

## 2017-05-10 NOTE — TELEPHONE ENCOUNTER
Verbal clearance: from Dr.Stephen Fajardo's Office    -This patient is ok to proceed with surgery at low/moderate cardiac risk     -Taken off plavix 7 days prior to procedure

## 2017-05-10 NOTE — H&P
CC:52 y/o female, left knee pain for 3-4 months, right hip-knee for same.     Date of Surgery: 5/8/2013 left knee ATS     HPI:52 y/o left pops, locks on her, increase pain after standing too long. Has tried NSAIDS, PT. Right hip and knne increase pain after long standing, flexes her back to reduce pain. Currently being seen for back, sciatic and drop foot issues. ROD- pending. Left knee starting to effect ADL's     PMH:        Past Medical History   Diagnosis Date    Acute angina      DDD (degenerative disc disease)      DVT (deep venous thrombosis)      Fibromyalgia      Foot drop      GERD (gastroesophageal reflux disease)      Hyperlipemia      Hypertension      Mitral valve prolapse      Sciatic nerve injury      Spondylolisthesis           PSH:         Past Surgical History   Procedure Laterality Date    Knee arthroscopy        Tubal ligation        Hysterectomy        Lipoma resection        Cardiac catheterization             Family Hx:         Family History   Problem Relation Age of Onset    Heart disease Mother      Diabetes Mother      Anesthesia problems Neg Hx      Broken bones Neg Hx      Cancer Neg Hx      Clotting disorder Neg Hx      Collagen disease Neg Hx      Dislocations Neg Hx      Osteoporosis Neg Hx      Rheumatologic disease Neg Hx      Scoliosis Neg Hx      Severe sprains Neg Hx           Allergy:         Review of patient's allergies indicates:   Allergen Reactions    Sulfa (sulfonamide antibiotics) Hives    Tylox [oxycodone-acetaminophen] Hives    Daypro [oxaprozin] Hives         Medication:   Current Outpatient Prescriptions:    atorvastatin (LIPITOR) 10 MG tablet, Take 10 mg by mouth once daily., Disp: , Rfl:    CHLO TUSS 1-30-12.5 mg/5 mL Liqd, TK 5 ML PO QID PRF COUGH/CONGESTION, Disp: , Rfl: 0   clopidogrel (PLAVIX) 75 mg tablet, , Disp: , Rfl:    cyanocobalamin 1,000 mcg/mL injection, , Disp: , Rfl:    cyclobenzaprine (FLEXERIL) 5 MG tablet, Take  "5 mg by mouth 3 (three) times daily as needed., Disp: , Rfl:    gabapentin (NEURONTIN) 300 MG capsule, , Disp: , Rfl:    meloxicam (MOBIC) 7.5 MG tablet, , Disp: , Rfl:    metoprolol tartrate (LOPRESSOR) 50 MG tablet, , Disp: , Rfl:    mometasone (NASONEX) 50 mcg/actuation nasal spray, 2 sprays by Nasal route once daily., Disp: , Rfl:    nabumetone (RELAFEN) 500 MG tablet, Take 1 tablet (500 mg total) by mouth 2 (two) times daily., Disp: 30 tablet, Rfl: 0   nitroGLYCERIN (NITROSTAT) 0.4 MG SL tablet, Place 0.4 mg under the tongue every 5 (five) minutes as needed., Disp: , Rfl:    TAMIFLU 75 mg capsule, TK 1 C PO BID FOR 5 DAYS, Disp: , Rfl: 0   tramadol (ULTRAM) 50 mg tablet, , Disp: , Rfl:      Social History:    Social History    Social History            Social History    Marital status:        Spouse name: N/A    Number of children: N/A    Years of education: N/A           Occupational History    Unemployed             Social History Main Topics    Smoking status: Never Smoker    Smokeless tobacco: Never Used    Alcohol use No    Drug use: No    Sexual activity: Not on file           Other Topics Concern    Not on file      Social History Narrative            Vitals:        Visit Vitals    /77    Pulse 62    Ht 5' 3" (1.6 m)    Wt 94.6 kg (208 lb 8.9 oz)    BMI 36.94 kg/m2         ROS:  GENERAL: No fever, chills, fatigability or weight loss.  SKIN: No rashes, itching or changes in color or texture of skin.  HEAD: No headaches or recent head trauma.  EYES: Visual acuity fine. No photophobia, ocular pain or diplopia.  EARS: Denies ear pain, discharge or vertigo.  NOSE: No loss of smell, no epistaxis or postnasal drip.  MOUTH & THROAT: No hoarseness or change in voice. No excessive gum bleeding.  NODES: Denies swollen glands.  CHEST: Denies CUBA, cyanosis, wheezing, cough and sputum production.  CARDIOVASCULAR: Denies chest pain, PND, orthopnea or reduced exercise " tolerance.  ABDOMEN: Appetite fine. No weight loss. Denies diarrhea, abdominal pain, hematemesis or blood in stool.  URINARY: No flank pain, dysuria or hematuria.  PERIPHERAL VASCULAR: No claudication or cyanosis.  NEUROLOGIC: No history of seizures, paralysis, alteration of gait or coordination.  MUSCULOSKELETAL: See HPI     PE:  APPEARANCE: Well nourished, well developed, in no acute distress.   HEAD: Normocephalic, atraumatic.  SKIN:no redness or signs of infection.  NEUROLOGIC: Cranial Nerves: II-XII grossly intact, also see MUSCULOSKELETAL  MUSCULOSKELETAL: increase bi-lateral crepitus, left increase tender joint line, mild effusion, increase pain with extension.  Right hip- tender along bursa, down lateral thigh.  Sensory intact, cap refill less than 2 sec.     Assessment:     Diagnosis:  1.Left knee pain ( hx of surgery)  2. Right knee pain         Diagnostic Studies  MRI-Yes,agree with the findings of Degenerative change in the patellofemoral joint and medial tibiofemoral joint. Subchondral cyst formation in the lateral aspect medial femoral condyle. Clinically exclude early osteochondritis dissecans.     X-Ray-Yes, There is no radiographic evidence of acute osseous, articular, or soft tissue abnormality. Mild joint space loss present at the patellofemoral compartment with minimal osteophyte production.  EMG/NCV-No  Arthrogram-No  Bone Scan-No  CT Scan-No  Doppler-No  ESR-No  CRP-No  CBC with Diff-No  Rheumatoid/Arthritis Panel-No        Plan:      1. PT-no   2.OT-no   3.NSAID-yes Continue Mobic   4. Narcotics-no   5. Wound care-N/A   6. Rest-no   7. Surgery- left knee ATS   8. KOURTNEY Hose-no   9. Anticoagulation therapy-no   10. Elevation-no   11. Crutches-no   12. Walker-no   13. Cane no   14. Referral-no   15.Injection-no   16. Splint / Cast / Cast Shoe-No   17. RICE  18. Follow up-  3 weeks .

## 2017-05-10 NOTE — ANESTHESIA PREPROCEDURE EVALUATION
05/10/2017  Suze Kim is a 51 y.o., female.    Anesthesia Evaluation    I have reviewed the Patient Summary Reports.        Review of Systems  Anesthesia Hx:  No problems with previous Anesthesia  Denies Family Hx of Anesthesia complications.   Denies Personal Hx of Anesthesia complications.   Social:  No Alcohol Use, Non-Smoker    Hematology/Oncology:  Hematology Normal   Oncology Normal   Hematology Comments: H/o  LE DVT on plavix ,stopped > 7 days ago    EENT/Dental:EENT/Dental Normal   Cardiovascular:   Hypertension Valvular problems/Murmurs, MVP Denies MI.   Angina: h/o Atypical chest pain, Negative cath in 2011 per pt, Saw Dr Fajardo cardiologist 2 weeks ago ,OK for surgery. hyperlipidemia ECG has been reviewed.    Pulmonary:   Sleep Apnea, CPAP    Renal/:  Renal/ Normal     Hepatic/GI:   GERD, well controlled    Musculoskeletal:   Arthritis   Spine Disorders: lumbar    Neurological:   Denies CVA. Neuromuscular disease: Foot drop sec to Sciatica.    Chronic Pain Syndrome   Psych:  Psychiatric Normal           Physical Exam   Airway/Jaw/Neck:  Airway Findings: Mouth Opening: Normal Mallampati: II                 Anesthesia Plan  Type of Anesthesia, risks & benefits discussed:  Anesthesia Type:  general  Patient's Preference:   Intra-op Monitoring Plan: standard ASA monitors  Intra-op Monitoring Plan Comments:   Post Op Pain Control Plan:   Post Op Pain Control Plan Comments:   Induction:   IV  Beta Blocker:  Patient is on a Beta-Blocker and has received one dose within the past 24 hours (No further documentation required).       Informed Consent: Patient understands risks and agrees with Anesthesia plan.  Questions answered. Anesthesia consent signed with patient.  ASA Score: 3     Day of Surgery Review of History & Physical:  There are no significant changes.          Ready For Surgery From  Anesthesia Perspective.

## 2017-05-10 NOTE — DISCHARGE INSTRUCTIONS
General Information:    1.  Do not drink alcoholic beverages including beer for 24 hours or as long as you are on pain medication..  2.  Do not drive a motor vehicle, operate machinery or power tools, or signs legal papers for 24 hours or as long as you are on pain medication.   3.  You may experience light-headedness, dizziness, and sleepiness following surgery. Please do not stay alone. A responsible adult should be with you for this 24 hour period.  4.  Go home and rest.    5. Progress slowly to a normal diet unless instructed.  Otherwise, begin with liquids such as soft drinks, then soup and crackers working up to solid foods. Drink plenty of nonalcoholic fluids.  6.  Certain anesthetics and pain medications produce nausea and vomiting in certain       individuals. If nausea becomes a problem at home, call you doctor.    7. A nurse will be calling you sometime after surgery. Do not be alarmed. This is our way of finding out how you are doing.    8. Several times every hour while you are awake, take 2-3 deep breaths and cough. If you had stomach surgery hold a pillow or rolled towel firmly against your stomach before you cough. This will help with any pain the cough might cause.  9. Several times every hour while you are awake, pump and flex your feet 5-6 times and do foot circles. This will help prevent blood clots.    10.Call your doctor for severe pain, bleeding, fever, or signs or symptoms of infection (pain, swelling, redness, foul odor, drainage).    11.You can contact your doctor anytime by [phone] for the [facility] (at [facility]) or [phone] for the [facility] on [facility].   [email or web address] is another way to contact your doctor if you are an active participant online with [facility].        Discharge Instructions for Knee Arthroscopy  You had knee arthroscopy. This surgical procedure uses small incisions to locate, identify, and treat problems inside the knee. These  problems include loose bodies, meniscal tears, bone spurs, osteochondritis dissecans (OCD), and synovitis. Below are tips to help speed your recovery from surgery.  Activity  · Dont drive until your doctor says its OK. And never drive while taking opioid pain medicine.  · Remember to take pain medicines as directed; dont wait for the pain to get bad. And don't drink alcohol while taking pain medicines.  · Follow weight-bearing instructions given by your doctor. He or she may require you to use crutches to keep weight off your knee.  · Unless your doctor tells you otherwise, begin using the affected knee as much as you can tolerate 3 days after surgery.  · Slowly bend and straighten your affected leg as far as you can, unless your doctor tells you otherwise. Do this several times a day.  · Rest your knee by lying down and putting pillows under it for the first 3 days after surgery. Keep your ankle elevated above the level of your heart. This helps keep swelling down.  · Follow your doctors instructions about wearing and caring for a brace, immobilizer, or elastic dressing.  · Point and flex your foot, and rotate your ankle as much as possible during the first few weeks following surgery. Also, wiggle your toes as much as possible.  Incision care  · Check your incision daily for redness, tenderness, or drainage.  · Dont be alarmed if there is some bruising, slight swelling of the knee, or a small amount of blood on the bandage.  · Adjust the bandage or brace as needed. It should feel supportive on your knee, but not too tight.   · Dont soak your incision in water (no hot tubs, bathtubs, swimming pools) until your doctor says its OK.  · Wait 2 day(s) after your surgery to begin showering. Then shower as needed. Cover your knee with plastic to keep the dressing or brace dry. Once your dressing is removed, follow your doctors instructions for care of the wound. And sit on a shower stool so that you dont fall  while showering.  · Use an ice pack or bag of frozen peas--or something similar--wrapped in a thin towel to reduce the swelling. Keep the foot elevated while you ice the knee. Apply the ice pack for 20 minutes; then remove it for 20 minutes. Repeat as needed. Icing helps reduce swelling.  Other precautions  · Arrange your household to keep the items you need within reach.  · Remove throw rugs, electrical cords, and anything else that may cause you to fall.  · Use nonslip bath mats, grab bars, an elevated toilet seat, and a shower chair in your bathroom.  · Use a cane, crutches, a walker, or handrails until your balance, flexibility, and strength improve, and you can put weight on your leg. And remember to ask for help from others when you need it.  · Free up your hands so that you can use them to keep balance. Use a jesus pack, apron, or pockets to carry things.  Follow-up  Make a follow-up appointment as directed by your doctor.     When to seek medical attention  Call 911 right away if you have any of the following:  · Chest pain  · Shortness of breath  · Severe nausea  Otherwise, call your doctor immediately if you have any of the following:  · Pain that is not relieved by medicine or rest  · Continued bleeding through the bandage  · Tingling, numbness, or coldness in your foot or leg  · Fever above 100.4°F (38.0°C) or shaking chills  · Excessive swelling, increased redness, or any drainage around the incision  · Swelling, tenderness, or pain in your leg      Date Last Reviewed: 11/16/2015 © 2000-2016 [employer]. [address] [place] [address], [place] [address]. All rights reserved. This information is not intended as a substitute for professional medical care. Always follow your healthcare professional's instructions.        Meperidine tablets  What is this medicine?  MEPERIDINE (me PER i dawna) is a pain reliever. It is used to treat moderate to severe pain.  How should I use this medicine?  Take this  medicine by mouth with a full glass of water. Follow the directions on the prescription label. Take your medicine at regular intervals. Do not take your medicine more often than directed.  A special MedGuide will be given to you by the pharmacist with each prescription and refill. Be sure to read this information carefully each time.  Talk to your pediatrician regarding the use of this medicine in children. While this drug may be prescribed for selected conditions, precautions do apply.  What side effects may I notice from receiving this medicine?  Side effects that you should report to your doctor or health care professional as soon as possible:  · allergic reactions like skin rash, itching or hives, swelling of the face, lips, or tongue  · breathing problems  · confusion  · seizures  · signs and symptoms of low blood pressure like dizziness; feeling faint or lightheaded, falls; unusually weak or tired  · trouble passing urine or change in the amount of urine  Side effects that usually do not require medical attention (report to your doctor or health care professional if they continue or are bothersome):  · constipation  · dry mouth  · nausea, vomiting  · tiredness  What may interact with this medicine?  Do not take this medicine with any of the following medications:  · MAOIs like Carbex, Eldepryl, Marplan, Nardil, and Parnate  · procarbazine  · ritonavir  This medicine may also interact with the following medications:  · acyclovir  · alcohol  · antihistamines for allergy, cough and cold  · certain medicines for anxiety or sleep  · certain medicines for depression like amitriptyline, fluoxetine, sertraline  · certain medicines for seizures like phenobarbital, phenytoin, primidone  · cimetidine  · furazolidone  · general anesthetics like halothane, isoflurane, methoxyflurane, propofol  · linezolid  · local anesthetics like lidocaine, pramoxine, tetracaine  · medicines that relax muscles for surgery  · other  narcotic medicines for pain or cough  · phenothiazines like chlorpromazine, mesoridazine, prochlorperazine, thioridazine  · valacyclovir  What if I miss a dose?  If you miss a dose, take it as soon as you can. If it is almost time for your next dose, take only that dose. Do not take double or extra doses.  Where should I keep my medicine?  Keep out of the reach of children. This medicine can be abused. Keep your medicine in a safe place to protect it from theft. Do not share this medicine with anyone. Selling or giving away this medicine is dangerous and against the law.  Store at room temperature between 15 and 30 degrees C (59 and 86 degrees F). Keep container tightly closed.  This medicine may cause accidental overdose and death if it is taken by other adults, children, or pets. Flush any unused medicine down the toilet to reduce the chance of harm. Do not use the medicine after the expiration date.  What should I tell my health care provider before I take this medicine?  They need to know if you have any of these conditions:  · brain tumor  · drug abuse or addiction  · head injury  · heart disease  · kidney disease  · liver disease  · lung disease or breathing difficulties, including asthma  · seizures  · use within 14 days of a MAOI like Carbex, Eldepryl, Marplan, Nardil, and Parnate  · an unusual or allergic reaction to meperidine or other opiate drugs, other medicines, foods, dyes, or preservatives  · pregnant or trying to get pregnant  · breast-feeding  What should I watch for while using this medicine?  Tell your doctor or health care professional if your pain does not go away, if it gets worse, or if you have new or a different type of pain. You may develop tolerance to the medicine. Tolerance means that you will need a higher dose of the medicine for pain relief. Tolerance is normal and is expected if you take the medicine for a long time.  Do not suddenly stop taking your medicine because you may develop  a severe reaction. Your body becomes used to the medicine. This does NOT mean you are addicted. Addiction is a behavior related to getting and using a drug for a non-medical reason. If you have pain, you have a medical reason to take pain medicine. Your doctor will tell you how much medicine to take. If your doctor wants you to stop the medicine, the dose will be slowly lowered over time to avoid any side effects.  There are different types of narcotic medicines (opiates). If you take more than one type at the same time or if you are taking another medicine that also causes drowsiness, you may have more side effects. Give your health care provider a list of all medicines you use. Your doctor will tell you how much medicine to take. Do not take more medicine than directed. Call emergency for help if you have problems breathing or unusual sleepiness.  You may get drowsy or dizzy. Do not drive, use machinery, or do anything that needs mental alertness until you know how this medicine affects you. Do not stand or sit up quickly, especially if you are an older patient. This reduces the risk of dizzy or fainting spells. Alcohol may interfere with the effect of this medicine. Avoid alcoholic drinks.  The medicine will cause constipation. Try to have a bowel movement at least every 2 to 3 days. If you do not have a bowel movement for 3 days, call your doctor or health care professional.  Your mouth may get dry. Chewing sugarless gum or sucking hard candy, and drinking plenty of water may help. Contact your doctor if the problem does not go away or is severe.  Date Last Reviewed:   NOTE:This sheet is a summary. It may not cover all possible information. If you have questions about this medicine, talk to your doctor, pharmacist, or health care provider. Copyright© 2016 [employer]

## 2017-05-10 NOTE — OP NOTE
OPERATIVE DICTATION:    DATE OF SERVICE:05/10/2017      Preoperative Diagnosis:  Left knee chondromalacia    Postoperative Diagnosis: Left knee medial femoral condyle chondromalacia, left medial and lateral meniscus tear, synovitis      Procedure: Left knee partial synovectomy, chondroplasty of the medial femoral condyle, partial endoscopic medial and lateral meniscectomy.    Indication for surgery: Left knee pain that interfered with activities of daily living.    Anesthesia: Gen. anesthesia.     Complications: None     Surgeon: Vincent Reyes M.D.     Specimen: None    Assistant:  PILO Marcos.  His assistance was critical with positioning of the extremity throughout the surgical procedure.   The physician assistant allowed me to access areas of the knee that could not be possible without the assistance of a trained orthopedic physician assistant.  His assistance was critical to allowing me to provide the highest level of care to the patient.      Operative procedure:  Left knee partial synovectomy, chondroplasty of the medial femoral condyle, partial endoscopic medial and lateral meniscectomy.    The patient was brought to operating room after appropriate consent and placed under general anesthesia with intubation.  The left lower extremity was prepped with alcohol, chlorhexidine and sterilely draped. He timeout was performed and the correct extremity identified.  The Esmarch used for exsanguination and tourniquet inflated to 300 mmHg pressure.  The inferior medial and inferolateral portals made.  The cannula inserted and the camera inserted.  Patient noted to have inflamed synovium in the medial and lateral gutter.  Shaver inserted and the synovectomy performed in those areas.  The medial compartment the patient noted to have a partial tear of the medial meniscus anterior horn.  Also noted to have grade 3 chondromalacia of the medial femoral condyle.  Shaver and biter inserted and the chondroplasty as  well as the partial endoscopic medial meniscectomy was performed.  The root remnant was beveled and stable.  The patient noted to have inflamed synovium and intercondylar notch extended into the popliteal area.  The shaver inserted and the synovectomy performed in those areas.  The anterior cruciate ligament noted to be intact PCL noted to be intact. Within the lateral compartment the patient noted to have a partial tear of the anterior horn of lateral meniscus.  The shaver and biter inserted and the partial endoscopic lateral meniscectomy performed.  The remnant was stable and beveled.  The lateral corners of the knee was clear following the synovectomy. The patella noted to track quite well. The fluid was exsanguinated from the knee. nferomedial and inferolateral portals closed using interrupted 3-0 nylon sutures.  The knee injected with 25% Marcaine plain and 1% Xylocaine plain.  Sterile gauze applied.  An Ace wrap applied and the tourniquet was deflated.the patient tolerated the procedure well.             Vincent Reyes M.D.

## 2017-05-10 NOTE — PLAN OF CARE
Pt alert and oriented. VSS. Pt reporting pain level 8/10, iv pain med being administered. Pt stating nausea has subsided after Zofran. VSS. Neurovascular checks remain intact. See flow sheet for detailed assessment. Will cont to monitor.

## 2017-05-10 NOTE — TRANSFER OF CARE
"Anesthesia Transfer of Care Note    Patient: Suze Kim    Procedure(s) Performed: Procedure(s) (LRB):  ARTHROSCOPY-KNEE; left knee (Left)  CHONDROPLASTY-KNEE (Left)  SYNOVECTOMY-KNEE (Left)  ARTHROSCOPY-MENISCECTOMY medial    Patient location: PACU    Anesthesia Type: general    Transport from OR: Transported from OR on room air with adequate spontaneous ventilation    Post pain: adequate analgesia    Post assessment: no apparent anesthetic complications    Post vital signs: stable    Level of consciousness: awake, alert and oriented    Nausea/Vomiting: no nausea/vomiting    Complications: none    Transfer of care protocol was followed      Last vitals:   Visit Vitals    BP (!) 148/83 (BP Location: Right arm, Patient Position: Sitting, BP Method: Automatic)    Pulse (!) 53    Temp 36.5 °C (97.7 °F) (Oral)    Resp 18    Ht 5' 4" (1.626 m)    Wt 94.3 kg (207 lb 14.3 oz)    SpO2 98%    Breastfeeding No    BMI 35.68 kg/m2     "

## 2017-05-10 NOTE — PLAN OF CARE
Pt and pt  given discharge instructions. Both stated understanding. Pt brought to main entrance via wheelchair with RN.

## 2017-05-11 ENCOUNTER — TELEPHONE (OUTPATIENT)
Dept: ORTHOPEDICS | Facility: CLINIC | Age: 52
End: 2017-05-11

## 2017-05-11 NOTE — TELEPHONE ENCOUNTER
Contacted pt. Informed pt rx   apixaban (ELIQUIS) 2.5 mg Tab 80 tablet 0 5/10/2017        Sig - Route: Take 1 tablet (2.5 mg total) by mouth 2 (two) times daily. - Oral      E-Prescribing Status: Receipt confirmed by pharmacy (5/10/2017  1:24 PM CDT)      Pt was able to verbalize all understanding. All questions answered.//lp

## 2017-05-11 NOTE — ANESTHESIA POSTPROCEDURE EVALUATION
"Anesthesia Post Evaluation    Patient: Suze Kim    Procedure(s) Performed: Procedure(s) (LRB):  ARTHROSCOPY-KNEE; left knee (Left)  CHONDROPLASTY-KNEE (Left)  SYNOVECTOMY-KNEE (Left)  ARTHROSCOPY-MENISCECTOMY medial    Final Anesthesia Type: general  Patient location during evaluation: PACU  Patient participation: Yes- Able to Participate  Level of consciousness: awake and alert  Post-procedure vital signs: reviewed and stable  Pain management: adequate  Airway patency: patent  PONV status at discharge: No PONV  Anesthetic complications: no      Cardiovascular status: stable  Respiratory status: spontaneous ventilation and room air  Follow-up not needed.        Visit Vitals    /88    Pulse 64    Temp 36.6 °C (97.9 °F) (Temporal)    Resp 19    Ht 5' 4" (1.626 m)    Wt 94.3 kg (207 lb 14.3 oz)    SpO2 98%    Breastfeeding No    BMI 35.68 kg/m2       Pain/Steve Score: Pain Assessment Performed: Yes (5/10/2017 12:05 PM)  Presence of Pain: complains of pain/discomfort (5/10/2017 12:05 PM)  Pain Rating Prior to Med Admin: 6 (5/10/2017 12:00 PM)  Steve Score: 10 (5/10/2017 12:05 PM)      "

## 2017-05-11 NOTE — TELEPHONE ENCOUNTER
----- Message from James Shi sent at 5/11/2017  8:35 AM CDT -----  Contact: pt  Pt is calling nurse staff regarding a RX for blood clots ,because pt has a history of blood clots. Pt call back [phone] to be advised asap. Thanks    [address] [place] - [address] [address]  [address] [address] [place] [address]  Phone: [phone] Fax: [phone]

## 2017-05-12 ENCOUNTER — TELEPHONE (OUTPATIENT)
Dept: ORTHOPEDICS | Facility: CLINIC | Age: 52
End: 2017-05-12

## 2017-05-12 DIAGNOSIS — Z98.890 POST-OPERATIVE STATE: Primary | ICD-10-CM

## 2017-05-12 NOTE — TELEPHONE ENCOUNTER
----- Message from Laura Nevarez sent at 5/11/2017  1:17 PM CDT -----  Contact: pt  States she wants too know if she can choose where she goes to have therapy.States she would like to go to [facility]. Please call pt at [phone]. Thank you

## 2017-05-12 NOTE — TELEPHONE ENCOUNTER
Spoke pt regarding therapy orders. Advised pt that orders were placed in the system and would be signed when Dr. Reyes returns to clinic on monday

## 2017-05-23 ENCOUNTER — TELEPHONE (OUTPATIENT)
Dept: ORTHOPEDICS | Facility: CLINIC | Age: 52
End: 2017-05-23

## 2017-05-23 NOTE — TELEPHONE ENCOUNTER
----- Message from Jocelyn Longo sent at 5/23/2017 11:56 AM CDT -----  Pt at [phone]//states your office called to let her know that her appt had to be rescheduled//she is suppose to go to therapy tomorrow//5-24-17//she is needing to  Know if she should wait until after her appt with Dr Reyes on 5/25/17//please call to inform//angel/divina

## 2017-05-23 NOTE — TELEPHONE ENCOUNTER
----- Message from Krysta White sent at 5/23/2017  7:33 AM CDT -----  Contact: pt  Please call pt @ [phone] regarding appt today, pt need to know if any cancellation, pt would like to come in earlier today if possible before 2:15.

## 2017-05-23 NOTE — TELEPHONE ENCOUNTER
Returned pt phone call. Informed pt that she could go to therapy before her post op appointment. Pt verified understanding.

## 2017-05-25 ENCOUNTER — OFFICE VISIT (OUTPATIENT)
Dept: ORTHOPEDICS | Facility: CLINIC | Age: 52
End: 2017-05-25
Payer: MEDICARE

## 2017-05-25 VITALS — SYSTOLIC BLOOD PRESSURE: 138 MMHG | DIASTOLIC BLOOD PRESSURE: 88 MMHG | HEART RATE: 57 BPM

## 2017-05-25 DIAGNOSIS — Z98.890 POSTOPERATIVE STATE: Primary | ICD-10-CM

## 2017-05-25 PROCEDURE — 99999 PR PBB SHADOW E&M-EST. PATIENT-LVL III: CPT | Mod: PBBFAC,,, | Performed by: ORTHOPAEDIC SURGERY

## 2017-05-25 PROCEDURE — 99024 POSTOP FOLLOW-UP VISIT: CPT | Mod: S$GLB,,, | Performed by: ORTHOPAEDIC SURGERY

## 2017-05-25 NOTE — PROGRESS NOTES
SUBJECTIVE:  Patient is status post Left knee ats.  Patient complains of  8/ 10 pain that is better with the  pain meds and aggravated with movement.    Past Medical History:   Diagnosis Date    Acute angina     DDD (degenerative disc disease)     DVT (deep venous thrombosis)     Fibromyalgia     Foot drop     GERD (gastroesophageal reflux disease)     Hyperlipemia     Hypertension     Mitral valve prolapse     DIOGENES on CPAP     Sciatic nerve injury     Spondylolisthesis      Past Surgical History:   Procedure Laterality Date    CARDIAC CATHETERIZATION      COLONOSCOPY      HYSTERECTOMY      KNEE ARTHROSCOPY      LIPOMA RESECTION      myelogram      TUBAL LIGATION       Review of patient's allergies indicates:   Allergen Reactions    Sulfa (sulfonamide antibiotics) Hives    Tylox [oxycodone-acetaminophen] Hives    Daypro [oxaprozin] Hives     Current Outpatient Prescriptions on File Prior to Visit   Medication Sig Dispense Refill    apixaban (ELIQUIS) 2.5 mg Tab Take 1 tablet (2.5 mg total) by mouth 2 (two) times daily. 80 tablet 0    atorvastatin (LIPITOR) 10 MG tablet Take 10 mg by mouth once daily.      clopidogrel (PLAVIX) 75 mg tablet       cyanocobalamin 1,000 mcg/mL injection       cyclobenzaprine (FLEXERIL) 5 MG tablet Take 5 mg by mouth 3 (three) times daily as needed.      gabapentin (NEURONTIN) 300 MG capsule 300 mg 2 (two) times daily as needed.       lisinopril 10 MG tablet Take 10 mg by mouth every evening.       meloxicam (MOBIC) 7.5 MG tablet       meperidine (DEMEROL) 50 mg tablet Take 1 tablet (50 mg total) by mouth every 4 (four) hours as needed for Pain. 60 tablet 0    metoprolol tartrate (LOPRESSOR) 50 MG tablet 50 mg 2 (two) times daily.       mometasone (NASONEX) 50 mcg/actuation nasal spray 2 sprays by Nasal route once daily.      nabumetone (RELAFEN) 500 MG tablet Take 1 tablet (500 mg total) by mouth 2 (two) times daily. 30 tablet 0    nitroGLYCERIN  (NITROSTAT) 0.4 MG SL tablet Place 0.4 mg under the tongue every 5 (five) minutes as needed.      OMEGA-3S/DHA/EPA/FISH OIL/D3 (VITAMIN-D + OMEGA-3 ORAL) Take by mouth once a week.       tramadol (ULTRAM) 50 mg tablet Take 1 tablet (50 mg total) by mouth every 8 (eight) hours as needed for Pain. 30 tablet 0     No current facility-administered medications on file prior to visit.      /88 (BP Location: Left arm, Patient Position: Sitting, BP Method: Automatic)   Pulse (!) 57    ROS:  GENERAL: No fever, chills, fatigability or weight loss.  SKIN: No rashes, itching or changes in color or texture of skin.  HEAD: No headaches or recent head trauma.  EYES: Visual acuity fine. No photophobia, ocular pain or diplopia.  EARS: Denies ear pain, discharge or vertigo.  NOSE: No loss of smell, no epistaxis or postnasal drip.  MOUTH & THROAT: No hoarseness or change in voice. No excessive gum bleeding.  NODES: Denies swollen glands.  CHEST: Denies CUBA, cyanosis, wheezing, cough and sputum production.  CARDIOVASCULAR: Denies chest pain, PND, orthopnea or reduced exercise tolerance.  ABDOMEN: Appetite fine. No weight loss. Denies diarrhea, abdominal pain, hematemesis or blood in stool.  URINARY: No flank pain, dysuria or hematuria.  PERIPHERAL VASCULAR: No claudication or cyanosis.  NEUROLOGIC: No history of seizures, paralysis, alteration of gait or coordination.  MUSCULOSKELETAL: See HPI    PE:  APPEARANCE: Well nourished, well developed, in no acute distress.   HEAD: Normocephalic, atraumatic.  EYES: PERRL. EOMI.   EARS: TM's intact. Light reflex normal. No retraction or perforation.   NOSE: Mucosa pink. Airway clear.  MOUTH & THROAT: No tonsillar enlargement. No pharyngeal erythema or exudate. No stridor.  NECK: Supple.   NODES: No cervical, axillary or inguinal lymph node enlargement.  CHEST: Lungs clear to auscultation.  CARDIOVASCULAR: Normal S1, S2. No rubs, murmurs or gallops.  ABDOMEN: Bowel sounds normal. Not  distended. Soft. No tenderness or masses.  NEUROLOGIC: Cranial Nerves: II-XII grossly intact, also see MUSCULOSKELETAL  MUSCULOSKELETAL:        Left Knee    -dressing intact, 2 plus dorsalis pedis and posterior tibial artery pulses, light touch intact Left lower extremity.  All digits are warm. No erythema, no warmth, no drainage, no swelling, no significant tenderness.  Less than 2 seconds capillary refill all digits.      ASSESSMENT:    The patient is stable and improving.  1. Lumbar spondylosis  2. Right sided sciatica    PLAN:  Suture out  Follow up in 4 weeks   Continue pain medication  Continue wound care  Continue physical therapy

## 2017-05-25 NOTE — PATIENT INSTRUCTIONS
Arthralgia    Arthralgia is the term for pain in or around the joint. It is a symptom, not a disease. This pain may involve one or more joints. In some cases, the pain moves from joint to joint.  There are many causes for joint pain. These include:  · Injury  · Osteoarthritis (wearing out of the joint surface)  · Gout (inflammation of the joint due to crystals in the joint fluid)  · Infection inside the joint    · Bursitis (inflammation of the fluid-filled sacs around the joint)  · Autoimmune disorders such as rheumatoid arthritis or lupus  · Tendonitis (inflamation of chords that attach muscle to bone)  Home care  · Rest the involved joint(s) until your symptoms improve.   · You may be prescribed pain medication. If none is prescribed, you may use acetaminophen or ibuprofen to control pain and inflammation.  Follow up  Follow up with your healthcare provider or our staff as advised.  When to seek medical care  Contact your healthcare provider right away if any of the following occurs:  · Pain, swelling, or redness of joint increases  · Pain worsens or recurs after a period of improvement  · Pain moves to other joints  · You cannot bear weight on the affected joint   · You cannot move the affected joint  · Joint appears deformed  · New rash appears  · Fever of 101ºF (38.8ºC) or higher, or as directed by your healthcare provider  Date Last Reviewed: 4/26/2015  © 5323-8345 [employer]. [address] [place] [address], [place] [address]. All rights reserved. This information is not intended as a substitute for professional medical care. Always follow your healthcare professional's instructions.

## 2017-07-13 ENCOUNTER — OFFICE VISIT (OUTPATIENT)
Dept: ORTHOPEDICS | Facility: CLINIC | Age: 52
End: 2017-07-13
Payer: MEDICARE

## 2017-07-13 VITALS
BODY MASS INDEX: 34.92 KG/M2 | HEIGHT: 64 IN | DIASTOLIC BLOOD PRESSURE: 78 MMHG | WEIGHT: 204.56 LBS | SYSTOLIC BLOOD PRESSURE: 100 MMHG

## 2017-07-13 DIAGNOSIS — M47.816 LUMBAR SPONDYLOSIS: Primary | ICD-10-CM

## 2017-07-13 PROCEDURE — 99024 POSTOP FOLLOW-UP VISIT: CPT | Mod: S$GLB,,, | Performed by: ORTHOPAEDIC SURGERY

## 2017-07-13 PROCEDURE — 99999 PR PBB SHADOW E&M-EST. PATIENT-LVL III: CPT | Mod: PBBFAC,,, | Performed by: ORTHOPAEDIC SURGERY

## 2017-07-13 NOTE — PROGRESS NOTES
SUBJECTIVE:  Patient is status post Left knee ats. She is 8 weeks postop.  Patient complains of  4/ 10 pain that is better with the  pain meds and aggravated with movement.    Past Medical History:   Diagnosis Date    Acute angina     DDD (degenerative disc disease)     DVT (deep venous thrombosis)     Fibromyalgia     Foot drop     GERD (gastroesophageal reflux disease)     Hyperlipemia     Hypertension     Mitral valve prolapse     DIOGENES on CPAP     Sciatic nerve injury     Spondylolisthesis      Past Surgical History:   Procedure Laterality Date    CARDIAC CATHETERIZATION      COLONOSCOPY      HYSTERECTOMY      KNEE ARTHROSCOPY      LIPOMA RESECTION      myelogram      TUBAL LIGATION       Review of patient's allergies indicates:   Allergen Reactions    Sulfa (sulfonamide antibiotics) Hives    Tylox [oxycodone-acetaminophen] Hives    Daypro [oxaprozin] Hives     Current Outpatient Prescriptions on File Prior to Visit   Medication Sig Dispense Refill    atorvastatin (LIPITOR) 10 MG tablet Take 10 mg by mouth once daily.      clopidogrel (PLAVIX) 75 mg tablet       cyanocobalamin 1,000 mcg/mL injection       cyclobenzaprine (FLEXERIL) 5 MG tablet Take 5 mg by mouth 3 (three) times daily as needed.      gabapentin (NEURONTIN) 300 MG capsule 300 mg 2 (two) times daily as needed.       lisinopril 10 MG tablet Take 10 mg by mouth every evening.       meloxicam (MOBIC) 7.5 MG tablet       meperidine (DEMEROL) 50 mg tablet Take 1 tablet (50 mg total) by mouth every 4 (four) hours as needed for Pain. 60 tablet 0    metoprolol tartrate (LOPRESSOR) 50 MG tablet 50 mg 2 (two) times daily.       mometasone (NASONEX) 50 mcg/actuation nasal spray 2 sprays by Nasal route once daily.      nabumetone (RELAFEN) 500 MG tablet Take 1 tablet (500 mg total) by mouth 2 (two) times daily. 30 tablet 0    nitroGLYCERIN (NITROSTAT) 0.4 MG SL tablet Place 0.4 mg under the tongue every 5 (five) minutes as  "needed.      OMEGA-3S/DHA/EPA/FISH OIL/D3 (VITAMIN-D + OMEGA-3 ORAL) Take by mouth once a week.       tramadol (ULTRAM) 50 mg tablet Take 1 tablet (50 mg total) by mouth every 8 (eight) hours as needed for Pain. 30 tablet 0    [DISCONTINUED] apixaban (ELIQUIS) 2.5 mg Tab Take 1 tablet (2.5 mg total) by mouth 2 (two) times daily. 80 tablet 0     No current facility-administered medications on file prior to visit.      /78   Ht 5' 4" (1.626 m)   Wt 92.8 kg (204 lb 9.4 oz)   BMI 35.12 kg/m²    ROS:  GENERAL: No fever, chills, fatigability or weight loss.  SKIN: No rashes, itching or changes in color or texture of skin.  HEAD: No headaches or recent head trauma.  EYES: Visual acuity fine. No photophobia, ocular pain or diplopia.  EARS: Denies ear pain, discharge or vertigo.  NOSE: No loss of smell, no epistaxis or postnasal drip.  MOUTH & THROAT: No hoarseness or change in voice. No excessive gum bleeding.  NODES: Denies swollen glands.  CHEST: Denies CUBA, cyanosis, wheezing, cough and sputum production.  CARDIOVASCULAR: Denies chest pain, PND, orthopnea or reduced exercise tolerance.  ABDOMEN: Appetite fine. No weight loss. Denies diarrhea, abdominal pain, hematemesis or blood in stool.  URINARY: No flank pain, dysuria or hematuria.  PERIPHERAL VASCULAR: No claudication or cyanosis.  NEUROLOGIC: No history of seizures, paralysis, alteration of gait or coordination.  MUSCULOSKELETAL: See HPI    PE:  APPEARANCE: Well nourished, well developed, in no acute distress.   HEAD: Normocephalic, atraumatic.  EYES: PERRL. EOMI.   EARS: TM's intact. Light reflex normal. No retraction or perforation.   NOSE: Mucosa pink. Airway clear.  MOUTH & THROAT: No tonsillar enlargement. No pharyngeal erythema or exudate. No stridor.  NECK: Supple.   NODES: No cervical, axillary or inguinal lymph node enlargement.  CHEST: Lungs clear to auscultation.  CARDIOVASCULAR: Normal S1, S2. No rubs, murmurs or gallops.  ABDOMEN: Bowel " sounds normal. Not distended. Soft. No tenderness or masses.  NEUROLOGIC: Cranial Nerves: II-XII grossly intact, also see MUSCULOSKELETAL  MUSCULOSKELETAL:        Left Knee    -2 plus dorsalis pedis and posterior tibial artery pulses, light touch intact Left lower extremity.  All digits are warm. No erythema, no warmth, no drainage, no swelling, no significant tenderness.  Less than 2 seconds capillary refill all digits.      ASSESSMENT:    The patient is stable and improving.  1. Lumbar spondylosis  2. Right sided sciatica    PLAN:     Follow up in 8 weeks   Continue pain medication  Continue physical therapy

## 2017-07-14 NOTE — PATIENT INSTRUCTIONS
Osteoarthritis  Osteoarthritis (also called degenerative joint disease) happens when the cartilage in a joint becomes damaged and worn. This may be due to age, wear and tear, overuse of the joint, or other problems. Osteoarthritis can affect any joint. But it is most common in hands, knees, spine, hips, and feet. Symptoms include joint stiffness, pain, and swelling.  Home care  · When a joint is more sore than usual, rest it for a day or two.  · Heat can help relieve stiffness. Take a hot bath or apply a heating pad for up to 30 minutes at a time. If symptoms are worse in the morning, using heat just after awakening can help relax the muscle and soothe the joints.   · Ice helps relieve pain and swelling. It is often used after activity. Use a cold pack wrapped in a thin cloth on the joint for 10-15 minutes at a time.   · Alternating hot and cold can also help relieve pain. Try this for 20 minutes at a time, several times per day.  · Exercise helps prevent the muscles and ligaments around the joint from becoming weak. It also helps maintain function in the joint.  Be as active as you can. Talk to your doctor about what activity program is best for you.  · Excess weight puts a lot of extra strain on weight-bearing joints of the lower back, hips, knees, feet and ankles. If you are overweight, talk to your doctor about a safe and effective weight loss program.  · Use anti-inflammatory medications as prescribed for pain. This incudes acetaminophen or NSAIDs such as ibuprofen or naproxen. If needed, topical or injected medications may be recommended. Talk to your doctor if these options are not enough to manage your pain.  · Talk with your doctor about devices that might help improve your function and reduce pain.  Follow-up care  Follow up with your doctor as advised by our staff.  When to seek medical attention  Call your doctor right away if any of the following occur:  · Redness or swelling of a painful  joint  · Discharge or pus from a painful joint  · Fever of 100.4ºF (38ºC) or higher, or as directed by your healthcare provider  · Worsening joint pain  · Decreased ability to move the joint or bear weight on the joint  Date Last Reviewed: 4/13/2015  © 8862-3890 [employer]. [address] [place] [address], [place] [address]. All rights reserved. This information is not intended as a substitute for professional medical care. Always follow your healthcare professional's instructions.

## 2017-10-22 PROBLEM — Z23 NEED FOR PROPHYLACTIC VACCINATION AND INOCULATION AGAINST INFLUENZA: Status: ACTIVE | Noted: 2017-10-22

## 2017-10-22 PROBLEM — R23.2 HOT FLASHES: Status: ACTIVE | Noted: 2017-10-22

## 2017-10-22 PROBLEM — E55.9 VITAMIN D DEFICIENCY DISEASE: Status: ACTIVE | Noted: 2017-10-22

## 2017-10-22 PROBLEM — E78.5 HYPERLIPIDEMIA: Status: ACTIVE | Noted: 2017-10-22

## 2017-10-22 PROBLEM — G47.30 SLEEP APNEA: Status: ACTIVE | Noted: 2017-10-22

## 2017-10-22 PROBLEM — I10 ESSENTIAL HYPERTENSION, BENIGN: Status: ACTIVE | Noted: 2017-10-22

## 2017-10-22 PROBLEM — E53.8 B12 DEFICIENCY: Status: ACTIVE | Noted: 2017-10-22

## 2017-10-22 PROBLEM — I25.10 CORONARY ARTERY DISEASE: Status: ACTIVE | Noted: 2017-10-22

## 2017-10-22 PROBLEM — I82.4Z2 DEEP VEIN THROMBOSIS (DVT) OF DISTAL VEIN OF LEFT LOWER EXTREMITY: Status: ACTIVE | Noted: 2017-10-22

## 2017-11-20 PROBLEM — I10 ESSENTIAL HYPERTENSION, MALIGNANT: Status: ACTIVE | Noted: 2017-11-20

## 2017-11-20 PROBLEM — Z79.899 ENCOUNTER FOR LONG-TERM (CURRENT) USE OF MEDICATIONS: Status: ACTIVE | Noted: 2017-11-20

## 2017-11-20 PROBLEM — M54.40 BACK PAIN OF LUMBAR REGION WITH SCIATICA: Status: ACTIVE | Noted: 2017-11-20

## 2017-11-20 PROBLEM — M10.9 GOUT: Status: ACTIVE | Noted: 2017-11-20

## 2017-11-20 PROBLEM — J30.9 CHRONIC ALLERGIC RHINITIS: Status: ACTIVE | Noted: 2017-11-20

## 2017-12-15 ENCOUNTER — HOSPITAL ENCOUNTER (EMERGENCY)
Facility: HOSPITAL | Age: 52
Discharge: HOME | End: 2017-12-15
Attending: EMERGENCY MEDICINE
Payer: MEDICARE

## 2017-12-15 VITALS
HEART RATE: 69 BPM | OXYGEN SATURATION: 99 % | TEMPERATURE: 98 F | BODY MASS INDEX: 32.02 KG/M2 | DIASTOLIC BLOOD PRESSURE: 80 MMHG | HEIGHT: 67 IN | SYSTOLIC BLOOD PRESSURE: 129 MMHG | WEIGHT: 204 LBS | RESPIRATION RATE: 18 BRPM

## 2017-12-15 DIAGNOSIS — S89.92XA LEFT KNEE INJURY: ICD-10-CM

## 2017-12-15 DIAGNOSIS — M25.562 ACUTE PAIN OF LEFT KNEE: Primary | ICD-10-CM

## 2017-12-15 DIAGNOSIS — S86.912A KNEE STRAIN, LEFT, INITIAL ENCOUNTER: ICD-10-CM

## 2017-12-15 PROCEDURE — 99283 EMERGENCY DEPT VISIT LOW MDM: CPT

## 2017-12-15 RX ORDER — HYDROCODONE BITARTRATE AND ACETAMINOPHEN 10; 325 MG/1; MG/1
1 TABLET ORAL EVERY 6 HOURS PRN
Qty: 20 TABLET | Refills: 0 | Status: SHIPPED | OUTPATIENT
Start: 2017-12-15 | End: 2018-01-30

## 2017-12-15 NOTE — ED PROVIDER NOTES
"SCRIBE #1 NOTE: I, Bernardo Rafael Maria L, am scribing for, and in the presence of, Miller Mendoza Jr., MD. I have scribed the entire note.      History      Chief Complaint   Patient presents with    Knee Pain     patient c/o left knee pain, patient denies injury        Review of patient's allergies indicates:   Allergen Reactions    Sulfa (sulfonamide antibiotics) Hives    Tylox [oxycodone-acetaminophen] Hives    Daypro [oxaprozin] Hives        HPI   HPI    12/15/2017, 3:05 PM   History obtained from the patient and       History of Present Illness: Suze Kim is a 52 y.o. female patient who presents to the Emergency Department for left knee pain which onset suddenly yesterday evening. Sxs are constant and moderate in severity. Pt reports she stood up from the couch, took a few steps, and then her knee "gave out". Pt now unable to bear weight on knee. Pt has had previous left meniscus tear. Sxs exacerbated with left knee movement. There are no other mitigating or exacerbating factors noted. Associated sxs include difficulty walking.  Pt denies any fever, N/V/D, fall, trauma, numbness, leg swelling, calf pain, ankle pain, foot pain, and all other sxs at this time. No further complaints or concerns at this time.      Arrival mode: Personal vehicle     PCP: Carly Tang MD       Past Medical History:  Past Medical History:   Diagnosis Date    Acute angina     Biotin deficiency disease     Cobalamin deficiency     Coronary arteriosclerosis     DDD (degenerative disc disease)     Deep venous embolism and thrombosis of left lower extremity     DVT (deep venous thrombosis)     Essential hypertension     Fibromyalgia     Foot drop     GERD (gastroesophageal reflux disease)     Hyperlipemia     Hypertension     Irritable bowel syndrome     Mitral valve prolapse     DIOGENES on CPAP     Sciatic nerve injury     Sleep apnea     Spondylolisthesis     Vitamin D deficiency        Past Surgical " History:  Past Surgical History:   Procedure Laterality Date    CARDIAC CATHETERIZATION      COLONOSCOPY      HYSTERECTOMY      KNEE ARTHROSCOPY      LIPOMA RESECTION      myelogram      TUBAL LIGATION           Family History:  Family History   Problem Relation Age of Onset    Heart disease Mother     Diabetes Mother     Anesthesia problems Neg Hx     Broken bones Neg Hx     Cancer Neg Hx     Clotting disorder Neg Hx     Collagen disease Neg Hx     Dislocations Neg Hx     Osteoporosis Neg Hx     Rheumatologic disease Neg Hx     Scoliosis Neg Hx     Severe sprains Neg Hx        Social History:  Social History     Social History Main Topics    Smoking status: Never Smoker    Smokeless tobacco: Never Used    Alcohol use No    Drug use: No    Sexual activity: Yes       ROS   Review of Systems   Constitutional: Negative for fever.   HENT: Negative for sore throat.    Respiratory: Negative for shortness of breath.    Cardiovascular: Negative for chest pain.   Gastrointestinal: Negative for abdominal pain, constipation, diarrhea, nausea and vomiting.   Genitourinary: Negative for dysuria.   Musculoskeletal: Positive for arthralgias (left knee) and gait problem (difficulty). Negative for back pain.   Skin: Negative for rash.   Neurological: Negative for weakness.   Hematological: Does not bruise/bleed easily.     Physical Exam      Initial Vitals [12/15/17 1450]   BP Pulse Resp Temp SpO2   129/80 69 18 98.2 °F (36.8 °C) 99 %      MAP       96.33          Physical Exam  Nursing Notes and Vital Signs Reviewed.  Constitutional: Patient is in no acute distress. Well-developed and well-nourished.  Head: Atraumatic. Normocephalic.  Eyes: PERRL. EOM intact. Conjunctivae are not pale. No scleral icterus.  ENT: Mucous membranes are moist. Oropharynx is clear and symmetric.    Neck: Supple. Full ROM. No lymphadenopathy.  Cardiovascular: Regular rate. Regular rhythm. No murmurs, rubs, or gallops. Distal pulses  "are 2+ and symmetric.  Pulmonary/Chest: No respiratory distress. Clear to auscultation bilaterally. No wheezing or rales.  Abdominal: Soft and non-distended.  There is no tenderness.  No rebound, guarding, or rigidity. Good bowel sounds.  Genitourinary: No CVA tenderness  Musculoskeletal: Moves all extremities. No obvious deformities. No edema. No calf tenderness.  Left Knee:  No obvious deformity. There is no swelling. Pain with AP pressure and varus/valgus movement. Positive ligament laxity. There is no tenderness.  No increased warmth, erythema, induration or fluctuance. DP and PT pulses are 2+.  Normal capillary refill.  Distal sensation is intact.  Skin: Warm and dry.  Neurological:  Alert, awake, and appropriate.  Normal speech.  No acute focal neurological deficits are appreciated.  Psychiatric: Normal affect. Good eye contact. Appropriate in content.    ED Course    Procedures  ED Vital Signs:  Vitals:    12/15/17 1450   BP: 129/80   Pulse: 69   Resp: 18   Temp: 98.2 °F (36.8 °C)   SpO2: 99%   Weight: 92.5 kg (204 lb)   Height: 5' 7" (1.702 m)     Imaging Results:  Imaging Results          X-Ray Knee Complete 4 or More Views Left (Final result)  Result time 12/15/17 15:22:12    Final result by Josemanuel Nieves MD (12/15/17 15:22:12)                 Impression:      No acute fracture or dislocation.        Electronically signed by: JOSEMANUEL NIEVES MD  Date:     12/15/17  Time:    15:22              Narrative:    History:  Pain    Comparison:  None    Results:  4 views of the left knee were obtained.    No evidence of acute fracture or dislocation.  Bony mineralization is normal.  Soft tissues are unremarkable.  Mild tricompartment degenerative changes. No significant joint effusion.                                      The Emergency Provider reviewed the vital signs and test results, which are outlined above.    ED Discussion     3:37 PM: Reassessed pt. Discussed with pt all pertinent ED information and results. " Discussed plan of treatment with pt. Gave pt all f/u and return to the ED instructions. All questions and concerns were addressed at this time. Pt understands and agrees to plan as discussed. Pt is stable for discharge.     I discussed with patient and/or family/caretaker that negative X-ray does not rule out occult fracture or other soft tissue injury.  Persistent pain greater than 7-10 days or increased pain requires follow up, specifically with orthopedics.     ED Medication(s):  Medications - No data to display    New Prescriptions    HYDROCODONE-ACETAMINOPHEN 10-325MG (NORCO)  MG TAB    Take 1 tablet by mouth every 6 (six) hours as needed for Pain.       Follow-up Information     Call  Vincent Reyes Sr, MD.    Specialty:  Orthopedic Surgery  Why:  Call Dr. Reyes Monday to schedule appt for recheck and further treatemtn of left knee pain  Contact information:  [address] [place] [address]  [phone]                     Medical Decision Making    Medical Decision Making:   Clinical Tests:   Radiological Study: Reviewed and Ordered           Scribe Attestation:   Scribe #1: I performed the above scribed service and the documentation accurately describes the services I performed. I attest to the accuracy of the note.    Attending:   Physician Attestation Statement for Scribe #1: I, Miller Mendoza Jr., MD, personally performed the services described in this documentation, as scribed by Bernardo Lisa, in my presence, and it is both accurate and complete.          Clinical Impression       ICD-10-CM ICD-9-CM   1. Acute pain of left knee M25.562 719.46   2. Left knee injury S89.92XA 959.7   3. Knee strain, left, initial encounter S86.912A 844.9       Disposition:   Disposition: Discharged  Condition: Stable         Miller Mendoza Jr., MD  12/15/17 0522

## 2017-12-17 PROBLEM — R79.9 ABNORMAL BLOOD CHEMISTRY: Status: ACTIVE | Noted: 2017-12-17

## 2017-12-17 PROBLEM — Z12.11 SCREENING FOR COLON CANCER: Status: ACTIVE | Noted: 2017-12-17

## 2017-12-28 ENCOUNTER — HOSPITAL ENCOUNTER (OUTPATIENT)
Dept: RADIOLOGY | Facility: HOSPITAL | Age: 52
Discharge: HOME | End: 2017-12-28
Attending: PHYSICIAN ASSISTANT
Payer: MEDICARE

## 2017-12-28 ENCOUNTER — OFFICE VISIT (OUTPATIENT)
Dept: ORTHOPEDICS | Facility: CLINIC | Age: 52
End: 2017-12-28
Payer: MEDICARE

## 2017-12-28 VITALS
WEIGHT: 203.94 LBS | SYSTOLIC BLOOD PRESSURE: 134 MMHG | RESPIRATION RATE: 12 BRPM | HEIGHT: 67 IN | BODY MASS INDEX: 32.01 KG/M2 | HEART RATE: 63 BPM | DIASTOLIC BLOOD PRESSURE: 80 MMHG

## 2017-12-28 DIAGNOSIS — M25.562 CHRONIC PAIN OF BOTH KNEES: ICD-10-CM

## 2017-12-28 DIAGNOSIS — M94.262 CHONDROMALACIA, LEFT KNEE: ICD-10-CM

## 2017-12-28 DIAGNOSIS — M25.562 CHRONIC PAIN OF LEFT KNEE: ICD-10-CM

## 2017-12-28 DIAGNOSIS — M25.562 CHRONIC PAIN OF BOTH KNEES: Primary | ICD-10-CM

## 2017-12-28 DIAGNOSIS — G89.29 CHRONIC PAIN OF BOTH KNEES: Primary | ICD-10-CM

## 2017-12-28 DIAGNOSIS — G89.29 CHRONIC PAIN OF BOTH KNEES: ICD-10-CM

## 2017-12-28 DIAGNOSIS — M25.561 CHRONIC PAIN OF BOTH KNEES: Primary | ICD-10-CM

## 2017-12-28 DIAGNOSIS — M25.561 CHRONIC PAIN OF BOTH KNEES: ICD-10-CM

## 2017-12-28 DIAGNOSIS — M25.562 ACUTE PAIN OF LEFT KNEE: Primary | ICD-10-CM

## 2017-12-28 DIAGNOSIS — G89.29 CHRONIC PAIN OF LEFT KNEE: ICD-10-CM

## 2017-12-28 PROCEDURE — 73564 X-RAY EXAM KNEE 4 OR MORE: CPT | Mod: 26,50,, | Performed by: RADIOLOGY

## 2017-12-28 PROCEDURE — 73564 X-RAY EXAM KNEE 4 OR MORE: CPT | Mod: TC,50,PO

## 2017-12-28 PROCEDURE — 99999 PR PBB SHADOW E&M-EST. PATIENT-LVL V: CPT | Mod: PBBFAC,,, | Performed by: PHYSICIAN ASSISTANT

## 2017-12-28 PROCEDURE — 99214 OFFICE O/P EST MOD 30 MIN: CPT | Mod: S$GLB,,, | Performed by: PHYSICIAN ASSISTANT

## 2017-12-28 NOTE — PROGRESS NOTES
Patient ID: Suze Kim is a 52 y.o. female.    Chief Complaint: Pain of the Left Knee      HPI: Suze Kim  is a 52 y.o. female who c/o Pain of the Left Knee   for duration of 2 weeks.  She tells me that the left knee gave out on her on 12/15/17.  This required her to go to the emergency room, because she was not able to weight-bear immediately.  While the knee gave out on her, she did not have a fall.  She has had a knee brace in the past, but tells me the Velcro straps do not work anymore.  Pain level today is 8 out of 10.  It is improved from 2 weeks ago.  Quality is a constant ache.  Alleviating factors include meloxicam, Tylenol, ice, and rest.  Aggravating factors include flexion, weightbearing, and nighttime.  She complains of associated swelling.  She had a knee scope by Dr. Reyes in May of this year.  He did partial medial, partial lateral meniscectomies.  He also did some chondroplasty of the medial femoral condyle.  Since some time reviewing his operative her note as well as his postoperative her.        Objective:        General    Nursing note and vitals reviewed.  Constitutional: She is oriented to person, place, and time. She appears well-developed and well-nourished.   HENT:   Head: Normocephalic and atraumatic.   Eyes: EOM are normal.   Cardiovascular: Normal rate and regular rhythm.    Pulmonary/Chest: Effort normal.   Abdominal: Soft.   Neurological: She is alert and oriented to person, place, and time.   Psychiatric: She has a normal mood and affect. Her behavior is normal.             Left Knee Exam     Inspection   Erythema: absent  Scars: present (healed scars consistent with recent left knee scope)  Swelling: absent  Effusion: absent  Deformity: deformity  Bruising: absent    Tenderness   The patient tender to palpation of the condyle.    Range of Motion   Extension: abnormal   Flexion:  110 abnormal     Tests   Meniscus   Jordan:  Medial - negative Lateral -  negative  Stability Lachman: normal (-1 to 2mm) PCL-Posterior Drawer: normal (0 to 2mm)  MCL - Valgus: normal (0 to 2mm)  LCL - Varus: normal (0 to 2mm)  Patella   Patellar Apprehension: negative  Patellar Tracking: normal  Patellar Grind: negative    Other   Meniscal Cyst: absent  Popliteal (Baker's) Cyst: absent  Sensation: normal    Comments:  Comp soft, cap refill < 2 sec.    Muscle Strength   Left Lower Extremity   Quadriceps:  4/5   Hamstrin/5     Vascular Exam       Edema  Left Lower Leg: absent            Xray:   Left knee standing xrays from today images and report were reviewed today.  I agree with the radiologist's interpretation.  Right knee: There is no radiographic evidence of acute osseous, articular, or soft tissue abnormality. Joint spaces are well preserved.  Left knee:  There is no radiographic evidence of acute osseous, articular, or soft tissue abnormality.  Possible subcortical cystic change noted in the medial femoral condyle.  Mild patellofemoral joint space narrowing noted laterally with small patellofemoral marginal osteophytes present.    Assessment:       Encounter Diagnoses   Name Primary?    Acute pain of left knee Yes    Chronic pain of left knee     Chondromalacia, left knee           Plan:       Suze FRIED was seen today for pain.    Diagnoses and all orders for this visit:    Acute pain of left knee  -     Ambulatory Referral to Physical/Occupational Therapy    Chronic pain of left knee  -     Ambulatory Referral to Physical/Occupational Therapy    Chondromalacia, left knee  -     Ambulatory Referral to Physical/Occupational Therapy    Ms. Kim comes in today for evaluation of the left knee.  Although she is established in the department, this is a new problem for me.  Additionally, she did have a new injury 2 weeks ago.  She can continue oral anti-inflammatories.  We will fit her for a new neoprene hinged knee brace.  I've also recommended formal physical therapy for range  of motion, as well as strengthening and manual modalities.  We briefly discussed a steroid injection.  However, she is terrified of needles and would like to hold off on that for now.  I ordered standing x-rays of the left knee today.  I wanted to check to see if she had any progression of arthritis in the left knee in comparison to x-rays a year ago.  She recently had a knee scope with partial medial and lateral meniscectomies.  However, on x-ray, she still has relatively well-maintained joint spaces.  patient verbalizes understanding and agrees with the above plan.  Return in about 1 month (around 1/28/2018).          The patient understands, chooses and consents to this plan and accepts all   the risks which include but are not limited to the risks mentioned above.     Disclaimer: This note was prepared using a voice recognition system and is likely to have sound alike errors within the text.

## 2018-01-30 ENCOUNTER — OFFICE VISIT (OUTPATIENT)
Dept: ORTHOPEDICS | Facility: CLINIC | Age: 53
End: 2018-01-30
Payer: MEDICARE

## 2018-01-30 VITALS
BODY MASS INDEX: 32.01 KG/M2 | WEIGHT: 203.94 LBS | SYSTOLIC BLOOD PRESSURE: 117 MMHG | HEIGHT: 67 IN | DIASTOLIC BLOOD PRESSURE: 79 MMHG | HEART RATE: 64 BPM

## 2018-01-30 DIAGNOSIS — S86.912D STRAIN OF LEFT KNEE AND LEG, SUBSEQUENT ENCOUNTER: Primary | ICD-10-CM

## 2018-01-30 PROCEDURE — 99213 OFFICE O/P EST LOW 20 MIN: CPT | Mod: S$GLB,,, | Performed by: PHYSICIAN ASSISTANT

## 2018-01-30 PROCEDURE — 3008F BODY MASS INDEX DOCD: CPT | Mod: S$GLB,,, | Performed by: PHYSICIAN ASSISTANT

## 2018-01-30 PROCEDURE — 99999 PR PBB SHADOW E&M-EST. PATIENT-LVL III: CPT | Mod: PBBFAC,,, | Performed by: PHYSICIAN ASSISTANT

## 2018-01-30 NOTE — LETTER
January 30, 2018      [facility]  [address] [place] [address]  Phone: [phone]  Fax: [phone]       Patient: Suze Kim   YOB: 1965  Date of Visit: 01/30/2018    To Physical Therapy:    Abundio Kim  was at [facility] on 01/30/2018. She may return to Physical Therapy, please continue treatment. Please increase muscle tone and strength.  If you have any questions or concerns, or if I can be of further assistance, please do not hesitate to contact me.    Sincerely,      Immanuel Estrada PA-C

## 2018-01-30 NOTE — LETTER
January 30, 2018      Suze Kim           [facility]  [address] [place] [address]  Phone: [phone]  Fax: [phone]          Patient: Suze Kim   MR Number: [id number]   YOB: 1965   Date of Visit: 1/30/2018       Dear Dr. Carly Tang:    Thank you for referring Suze Kim to me for evaluation. Attached you will find relevant portions of my assessment and plan of care.    If you have questions, please do not hesitate to call me. I look forward to following Suze Kim along with you.    Sincerely,    Immanuel Estrada PA-C    Enclosure  CC:  No Recipients    If you would like to receive this communication electronically, please contact [email or web address] or [phone] to request more information on [employer] access.    For providers and/or their staff who would like to refer a patient to [facility], please contact us through our one-stop-shop provider referral line, Kelly Ahumada, at [phone].    If you feel you have received this communication in error or would no longer like to receive these types of communications, please e-mail [email or web address]

## 2018-01-30 NOTE — PROGRESS NOTES
Date of Surgery: 05/10/2017-  Left knee partial synovectomy, chondroplasty of the medial femoral condyle, partial endoscopic medial and lateral meniscectomy.    SUBJECTIVE:  Patient is status post Left knee ATS on the date above.  Was doing very well until her knee gave out on her in December.  She reported emergency room, x-rays normal she continues to improve.  Has restart physical therapy and water therapy and doing better.  Pain level at ×6 on a 10 scale     Past Medical History:   Diagnosis Date    Acute angina     Biotin deficiency disease     Cobalamin deficiency     Coronary arteriosclerosis     DDD (degenerative disc disease)     Deep venous embolism and thrombosis of left lower extremity     DVT (deep venous thrombosis)     Essential hypertension     Fibromyalgia     Foot drop     GERD (gastroesophageal reflux disease)     Hyperlipemia     Hypertension     Irritable bowel syndrome     Mitral valve prolapse     DIOGENES on CPAP     Sciatic nerve injury     Sleep apnea     Spondylolisthesis     Vitamin D deficiency      Past Surgical History:   Procedure Laterality Date    CARDIAC CATHETERIZATION      COLONOSCOPY      HYSTERECTOMY      KNEE ARTHROSCOPY      LIPOMA RESECTION      myelogram      TUBAL LIGATION       Review of patient's allergies indicates:   Allergen Reactions    Sulfa (sulfonamide antibiotics) Hives    Tylox [oxycodone-acetaminophen] Hives    Daypro [oxaprozin] Hives     Current Outpatient Prescriptions on File Prior to Visit   Medication Sig Dispense Refill    clopidogrel (PLAVIX) 75 mg tablet Take 1 tablet (75 mg total) by mouth once daily. 90 tablet 1    cyanocobalamin 1,000 mcg/mL injection       cyclobenzaprine (FLEXERIL) 5 MG tablet Take 5 mg by mouth 3 (three) times daily as needed.      gabapentin (NEURONTIN) 300 MG capsule 300 mg 2 (two) times daily as needed.       hydrocodone-acetaminophen 10-325mg (NORCO)  mg Tab Take 1 tablet by mouth every 6  "(six) hours as needed for Pain. 20 tablet 0    lisinopril 10 MG tablet Take 1 tablet (10 mg total) by mouth once daily. 30 tablet 5    meloxicam (MOBIC) 7.5 MG tablet Take 1 tablet (7.5 mg total) by mouth daily as needed. 90 tablet 1    meperidine (DEMEROL) 50 mg tablet Take 1 tablet (50 mg total) by mouth every 4 (four) hours as needed for Pain. 60 tablet 0    metoprolol tartrate (LOPRESSOR) 50 MG tablet Take 1 tablet (50 mg total) by mouth 2 (two) times daily. 180 tablet 1    mometasone (NASONEX) 50 mcg/actuation nasal spray 2 sprays by Nasal route once daily. 1 each 5    montelukast (SINGULAIR) 10 mg tablet Take 1 tablet (10 mg total) by mouth nightly. 30 tablet 5    MV-MN/IRON/FOLIC ACID/HERB 190 (VITAMIN D3 COMPLETE ORAL) Take by mouth.      nitroGLYCERIN (NITROSTAT) 0.4 MG SL tablet Place 0.4 mg under the tongue every 5 (five) minutes as needed.      OMEGA-3S/DHA/EPA/FISH OIL/D3 (VITAMIN-D + OMEGA-3 ORAL) Take by mouth once a week.       paroxetine (PAXIL) 10 MG tablet Take 1 tablet (10 mg total) by mouth every morning. 30 tablet 5    rosuvastatin (CRESTOR) 40 MG Tab Take 1 tablet (40 mg total) by mouth every evening. 30 tablet 5    tramadol (ULTRAM) 50 mg tablet Take 1 tablet (50 mg total) by mouth every 8 (eight) hours as needed for Pain. 30 tablet 0     No current facility-administered medications on file prior to visit.      /79 (BP Location: Left arm, Patient Position: Sitting, BP Method: Medium (Automatic))   Pulse 64   Ht 5' 7" (1.702 m)   Wt 92.5 kg (203 lb 14.8 oz)   BMI 31.94 kg/m²    ROS:  GENERAL: No fever, chills, fatigability or weight loss.  SKIN: No rashes, itching or changes in color or texture of skin.  HEAD: No headaches or recent head trauma.  EYES: Visual acuity fine. No photophobia, ocular pain or diplopia.  EARS: Denies ear pain, discharge or vertigo.  NOSE: No loss of smell, no epistaxis or postnasal drip.  MOUTH & THROAT: No hoarseness or change in voice. No " excessive gum bleeding.  NODES: Denies swollen glands.  CHEST: Denies CUBA, cyanosis, wheezing, cough and sputum production.  CARDIOVASCULAR: Denies chest pain, PND, orthopnea or reduced exercise tolerance.  ABDOMEN: Appetite fine. No weight loss. Denies diarrhea, abdominal pain, hematemesis or blood in stool.  URINARY: No flank pain, dysuria or hematuria.  PERIPHERAL VASCULAR: No claudication or cyanosis.  NEUROLOGIC: No history of seizures, paralysis, alteration of gait or coordination.  MUSCULOSKELETAL: See HPI    PE:  APPEARANCE: Well nourished, well developed, in no acute distress.   HEAD: Normocephalic, atraumatic.  EYES: PERRL. EOMI.   EARS: TM's intact. Light reflex normal. No retraction or perforation.   NOSE: Mucosa pink. Airway clear.  MOUTH & THROAT: No tonsillar enlargement. No pharyngeal erythema or exudate. No stridor.  NECK: Supple.   NODES: No cervical, axillary or inguinal lymph node enlargement.  CHEST: Lungs clear to auscultation.  CARDIOVASCULAR: Normal S1, S2. No rubs, murmurs or gallops.  ABDOMEN: Bowel sounds normal. Not distended. Soft. No tenderness or masses.  NEUROLOGIC: Cranial Nerves: II-XII grossly intact, also see MUSCULOSKELETAL  MUSCULOSKELETAL:      Left Knee    -2 plus dorsalis pedis and posterior tibial artery pulses, light touch intact Left lower extremity.  All digits are warm. No erythema, no warmth, no drainage, no swelling, no significant tenderness.  Less than 2 seconds capillary refill all digits.    Diagnosis    1. Left knee strain       PLAN:     Follow up in6  weeks   Attending physical therapy and occupational therapy with emphasis on muscle tone and muscle strength.

## 2018-08-23 ENCOUNTER — PATIENT MESSAGE (OUTPATIENT)
Dept: ORTHOPEDICS | Facility: CLINIC | Age: 53
End: 2018-08-23

## 2018-08-23 ENCOUNTER — OFFICE VISIT (OUTPATIENT)
Dept: ORTHOPEDICS | Facility: CLINIC | Age: 53
End: 2018-08-23
Payer: MEDICARE

## 2018-08-23 ENCOUNTER — HOSPITAL ENCOUNTER (OUTPATIENT)
Dept: RADIOLOGY | Facility: HOSPITAL | Age: 53
Discharge: HOME | End: 2018-08-23
Attending: PHYSICIAN ASSISTANT
Payer: MEDICARE

## 2018-08-23 VITALS
RESPIRATION RATE: 12 BRPM | HEIGHT: 67 IN | DIASTOLIC BLOOD PRESSURE: 78 MMHG | BODY MASS INDEX: 32.01 KG/M2 | WEIGHT: 203.94 LBS | SYSTOLIC BLOOD PRESSURE: 116 MMHG | HEART RATE: 62 BPM

## 2018-08-23 DIAGNOSIS — M79.651 RIGHT THIGH PAIN: Primary | ICD-10-CM

## 2018-08-23 DIAGNOSIS — M54.50 LOW BACK PAIN, NON-SPECIFIC: ICD-10-CM

## 2018-08-23 DIAGNOSIS — M54.16 LUMBAR RADICULOPATHY: ICD-10-CM

## 2018-08-23 DIAGNOSIS — M25.551 RIGHT HIP PAIN: ICD-10-CM

## 2018-08-23 DIAGNOSIS — M25.551 RIGHT HIP PAIN: Primary | ICD-10-CM

## 2018-08-23 DIAGNOSIS — M70.61 GREATER TROCHANTERIC BURSITIS OF RIGHT HIP: ICD-10-CM

## 2018-08-23 DIAGNOSIS — I82.4Z2 DEEP VEIN THROMBOSIS (DVT) OF DISTAL VEIN OF LEFT LOWER EXTREMITY, UNSPECIFIED CHRONICITY: ICD-10-CM

## 2018-08-23 PROCEDURE — 72110 X-RAY EXAM L-2 SPINE 4/>VWS: CPT | Mod: TC,FY,PO

## 2018-08-23 PROCEDURE — 3008F BODY MASS INDEX DOCD: CPT | Mod: CPTII,S$GLB,, | Performed by: PHYSICIAN ASSISTANT

## 2018-08-23 PROCEDURE — 72110 X-RAY EXAM L-2 SPINE 4/>VWS: CPT | Mod: 26,,, | Performed by: RADIOLOGY

## 2018-08-23 PROCEDURE — 99999 PR PBB SHADOW E&M-EST. PATIENT-LVL V: CPT | Mod: PBBFAC,,, | Performed by: PHYSICIAN ASSISTANT

## 2018-08-23 PROCEDURE — 73502 X-RAY EXAM HIP UNI 2-3 VIEWS: CPT | Mod: TC,FY,PO,RT

## 2018-08-23 PROCEDURE — 3074F SYST BP LT 130 MM HG: CPT | Mod: CPTII,S$GLB,, | Performed by: PHYSICIAN ASSISTANT

## 2018-08-23 PROCEDURE — 3078F DIAST BP <80 MM HG: CPT | Mod: CPTII,S$GLB,, | Performed by: PHYSICIAN ASSISTANT

## 2018-08-23 PROCEDURE — 73502 X-RAY EXAM HIP UNI 2-3 VIEWS: CPT | Mod: 26,RT,, | Performed by: RADIOLOGY

## 2018-08-23 PROCEDURE — 99214 OFFICE O/P EST MOD 30 MIN: CPT | Mod: S$GLB,,, | Performed by: PHYSICIAN ASSISTANT

## 2018-08-23 RX ORDER — LIDOCAINE AND PRILOCAINE 25; 25 MG/G; MG/G
CREAM TOPICAL
COMMUNITY
Start: 2018-06-08

## 2018-08-23 RX ORDER — ASPIRIN 325 MG
TABLET, DELAYED RELEASE (ENTERIC COATED) ORAL
COMMUNITY
Start: 2018-02-15

## 2018-08-23 RX ORDER — METHYLPREDNISOLONE 4 MG/1
TABLET ORAL
Qty: 1 PACKAGE | Refills: 0 | Status: SHIPPED | OUTPATIENT
Start: 2018-08-23

## 2018-08-23 RX ORDER — ALLOPURINOL 100 MG/1
TABLET ORAL
COMMUNITY
Start: 2018-06-27

## 2018-08-23 NOTE — PROGRESS NOTES
Patient ID: Suze Kim is a 53 y.o. female.    Chief Complaint: Pain of the Right Knee      HPI: Suze Kim  is a 53 y.o. female who c/o Pain of the Right Knee   for duration of several months.  She denies any inciting injury.  She complains of pain in the right lower back which radiates to the right hip and down into the foot. She also complains of swelling in the right thigh which ends at the knee.  Per her report, she tells me she had an ultrasound done by her primary care doctor, Dr. Butler within the last couple of weeks.  She states it was negative. She states he also did x-rays of her thigh which were also negative. Her pain level is 8/10 in severity.  She has having difficulty with ambulation secondary to pain in the right lower extremity.  Quality is aching, sharp, shooting.  It is constant.  It is worsened with weight-bearing.  It is improved with a topical cream.  Of note, she has a history of gout.  She also has a history of lower back problems.  She has seen Dr. Lamas in the past and is not due for a check up with him for a month or 2.    Past Medical History:   Diagnosis Date    Acute angina     Biotin deficiency disease     Cobalamin deficiency     Coronary arteriosclerosis     DDD (degenerative disc disease)     Deep venous embolism and thrombosis of left lower extremity     DVT (deep venous thrombosis)     Essential hypertension     Fibromyalgia     Foot drop     GERD (gastroesophageal reflux disease)     Hyperlipemia     Hypertension     Irritable bowel syndrome     Mitral valve prolapse     DIOGENES on CPAP     Sciatic nerve injury     Sleep apnea     Spondylolisthesis     Vitamin D deficiency      Past Surgical History:   Procedure Laterality Date    CARDIAC CATHETERIZATION      COLONOSCOPY      HYSTERECTOMY      KNEE ARTHROSCOPY      LIPOMA RESECTION      myelogram      TUBAL LIGATION       Family History   Problem Relation Age of Onset    Heart disease  Mother     Diabetes Mother     Anesthesia problems Neg Hx     Broken bones Neg Hx     Cancer Neg Hx     Clotting disorder Neg Hx     Collagen disease Neg Hx     Dislocations Neg Hx     Osteoporosis Neg Hx     Rheumatologic disease Neg Hx     Scoliosis Neg Hx     Severe sprains Neg Hx      Social History     Socioeconomic History    Marital status:      Spouse name: Not on file    Number of children: Not on file    Years of education: Not on file    Highest education level: Not on file   Social Needs    Financial resource strain: Not on file    Food insecurity - worry: Not on file    Food insecurity - inability: Not on file    Transportation needs - medical: Not on file    Transportation needs - non-medical: Not on file   Occupational History    Occupation: Unemployed   Tobacco Use    Smoking status: Never Smoker    Smokeless tobacco: Never Used   Substance and Sexual Activity    Alcohol use: No    Drug use: No    Sexual activity: Yes   Other Topics Concern    Not on file   Social History Narrative    Not on file     Medication List with Changes/Refills   New Medications    METHYLPREDNISOLONE (MEDROL DOSEPACK) 4 MG TABLET    use as directed   Current Medications    ALLOPURINOL (ZYLOPRIM) 100 MG TABLET        CHOLECALCIFEROL, VITAMIN D3, 50,000 UNIT CAPSULE    TAKE ONE CAPSULE BY MOUTH ONCE A WEEK    CHOLECALCIFEROL, VITAMIN D3, 50,000 UNIT CAPSULE    TAKE ONE CAPSULE BY MOUTH ONCE A WEEK    CLOPIDOGREL (PLAVIX) 75 MG TABLET    Take 1 tablet (75 mg total) by mouth once daily.    CYANOCOBALAMIN 1,000 MCG/ML INJECTION        CYCLOBENZAPRINE (FLEXERIL) 5 MG TABLET    Take 5 mg by mouth 3 (three) times daily as needed.    GABAPENTIN (NEURONTIN) 300 MG CAPSULE    300 mg 2 (two) times daily as needed.     LIDOCAINE-PRILOCAINE (EMLA) CREAM        LISINOPRIL 10 MG TABLET    Take 1 tablet (10 mg total) by mouth once daily.    MELOXICAM (MOBIC) 7.5 MG TABLET    Take 1 tablet (7.5 mg total) by  mouth daily as needed.    METOPROLOL TARTRATE (LOPRESSOR) 50 MG TABLET    Take 1 tablet (50 mg total) by mouth 2 (two) times daily.    MOMETASONE (NASONEX) 50 MCG/ACTUATION NASAL SPRAY    2 sprays by Nasal route once daily.    MONTELUKAST (SINGULAIR) 10 MG TABLET    Take 1 tablet (10 mg total) by mouth nightly.    NITROGLYCERIN (NITROSTAT) 0.4 MG SL TABLET    Place 0.4 mg under the tongue every 5 (five) minutes as needed.    ROSUVASTATIN (CRESTOR) 40 MG TAB    Take 1 tablet (40 mg total) by mouth every evening.    TRAMADOL (ULTRAM) 50 MG TABLET    Take 1 tablet (50 mg total) by mouth every 8 (eight) hours as needed for Pain.   Discontinued Medications    MV-MN/IRON/FOLIC ACID/HERB 190 (VITAMIN D3 COMPLETE ORAL)    Take by mouth.    OMEGA-3S/DHA/EPA/FISH OIL/D3 (VITAMIN-D + OMEGA-3 ORAL)    Take by mouth once a week.     PAROXETINE (PAXIL) 10 MG TABLET    Take 1 tablet (10 mg total) by mouth every morning.     Review of patient's allergies indicates:   Allergen Reactions    Sulfa (sulfonamide antibiotics) Hives    Tylox [oxycodone-acetaminophen] Hives    Daypro [oxaprozin] Hives           Objective:        General    Nursing note and vitals reviewed.  Constitutional: She is oriented to person, place, and time. She appears well-developed and well-nourished.   HENT:   Head: Normocephalic and atraumatic.   Eyes: EOM are normal.   Cardiovascular: Normal rate and regular rhythm.    Pulmonary/Chest: Effort normal.   Abdominal: Soft.   Neurological: She is alert and oriented to person, place, and time.   Psychiatric: She has a normal mood and affect. Her behavior is normal.     General Musculoskeletal Exam   Gait: abnormal     Right Ankle/Foot Exam     Tests   Tiptoe Walk: unable to perform    Left Ankle/Foot Exam     Tests   Tiptoe Walk: unable to perform    Right Knee Exam   Right knee exam is normal.    Inspection   Erythema: absent  Swelling: absent  Effusion: effusion  Deformity: deformity  Bruising:  absent    Tenderness   The patient is experiencing no tenderness.     Range of Motion   Extension: normal   Flexion: normal     Tests   Meniscus   Jordan:  Medial - negative Lateral - negative  Ligament Examination Lachman: normal (-1 to 2mm) PCL-Posterior Drawer: normal (0 to 2mm)     MCL - Valgus: normal (0 to 2mm)  LCL - Varus: normal  Patella   Patellar apprehension: negative  Patellar Tracking: normal  Patellar Grind: negative    Other   Meniscal Cyst: absent  Popliteal (Baker's) Cyst: absent  Sensation: normal    Comments:  Comp soft, cap refill < 2 sec.    Left Knee Exam     Range of Motion   Extension: normal   Flexion: normal     Tests   Stability Lachman: normal (-1 to 2mm) PCL-Posterior Drawer: normal (0 to 2mm)  MCL - Valgus: normal (0 to 2mm)  LCL - Varus: normal (0 to 2mm)    Other   Sensation: normal    Right Hip Exam     Tenderness   The patient tender to palpation of the trochanteric bursa.    Tests   Dagmar: positive  Log Roll: negative    Comments:  TTP anterior Thigh and GT  Unable to assess labral signs  Pain/weakness with Hip flexion    Back (L-Spine & T-Spine) / Neck (C-Spine) Exam     Back (L-Spine & T-Spine) Range of Motion   Extension: normal   Flexion: normal   Rotation right: normal   Rotation left: normal     Spinal Sensation   Right Side Sensation  L-Spine Level: normal  Left Side Sensation  L-Spine Level: normal    Comments:  (-) SLR test LLE  (+) SLR test RLE      Muscle Strength   Right Lower Extremity   Hip Abduction: 4/5   Hip Adduction: 4/5   Hip Flexion: 3/5   Hip Extensors: 4/5  Quadriceps:  4/5   Hamstrin/5   Ankle Dorsiflexion:  4/5   Anterior tibial:  4/5/5  Gastrocsoleus:  3/5/5  EHL:  4/5  Left Lower Extremity   Hip Abduction: 4/5   Hip Adduction: 4/5   Hip Flexion: 4/5   Hip Extensors: 4/5  Quadriceps:  4/5   Hamstrin/5   Ankle Dorsiflexion:  4/5   Anterior tibial:  4/5/5   Gastrocsoleus:  3/5/5  EHL:  4/5    Reflexes     Left Side  Quadriceps:  1+  Achilles:   "1+    Right Side   Quadriceps:  1+  Achilles:  1+    Vascular Exam       Edema  Right Upper Leg: present (mild swelling right thigh.  Measures about 3/4" larger than the left at the point 5" above the superior pole of the patella)  Right Lower Leg: absent  Left Lower Leg: absent              Assessment:       Encounter Diagnoses   Name Primary?    Right thigh pain Yes    Greater trochanteric bursitis of right hip     Lumbar radiculopathy     Deep vein thrombosis (DVT) of distal vein of left lower extremity, unspecified chronicity           Plan:       Suze FRIED was seen today for pain.    Diagnoses and all orders for this visit:    Right thigh pain  -     methylPREDNISolone (MEDROL DOSEPACK) 4 mg tablet; use as directed  -     Cancel: Ambulatory Referral to Physical/Occupational Therapy  -     Ambulatory Referral to Physical/Occupational Therapy    Greater trochanteric bursitis of right hip  -     methylPREDNISolone (MEDROL DOSEPACK) 4 mg tablet; use as directed  -     Cancel: Ambulatory Referral to Physical/Occupational Therapy  -     Ambulatory Referral to Physical/Occupational Therapy    Lumbar radiculopathy  -     methylPREDNISolone (MEDROL DOSEPACK) 4 mg tablet; use as directed  -     Cancel: Ambulatory Referral to Physical/Occupational Therapy  -     Ambulatory Referral to Physical/Occupational Therapy    Deep vein thrombosis (DVT) of distal vein of left lower extremity, unspecified chronicity      Suze FRIED Muse comes in today with the above problems.  This is an established patient with new problems as above. She has no x-rays available for review today. I will send her to get x-rays of the right hip as well as lumbar spine on her way out.  I will plan to call her later today with those results.  I have given her prescription for Medrol Dosepak.  I am suspecting lumbar radiculopathy to at least be playing a partial role in her pain. With her history of previous DVT, my main concern with regards to " her swelling is whether not she has a DVT.  She tells me she had a recent ultrasound and it was negative.  However, if symptoms persist, we may think about repeating the ultrasound.  Additionally, she will get started with physical therapy for the leg and back.  She is requesting a referral be [facility] to [facility].  We will help take care of that for her.  Lastly, I would like her to follow up with Dr. Lamas.  I would like his input on whether not he thinks this is radicular in nature.  She has asked whether not she should move forward with an epidural steroid injection if he recommends it. I have explained that he is a back specialist and would defer completely to his judgment and trust his judgment with regards to improving her symptoms if it is radicular in nature.  She verbalizes understanding and agrees with the above plan.    Follow-up in about 1 month (around 9/23/2018).    The patient understands, chooses and consents to this plan and accepts all   the risks which include but are not limited to the risks mentioned above.     Disclaimer: This note was prepared using a voice recognition system and is likely to have sound alike errors within the text.

## 2023-08-28 NOTE — PROGRESS NOTES
Pre op instructions reviewed with patient per phone:    To confirm, Your surgeon has instructed you:  Surgery is scheduled 5/10/17at 0900.      Please report to [facility] 1st floor main lobby by 0730.      INSTRUCTIONS IMPORTANT!!!  ¨ Do not eat, drink, or smoke after 12 midnight-including water. OK to brush teeth, no gum, candy or mints!    ¨ Take only these medicines with a small swallow of water-morning of surgery.  Metoprolol. Ok to take Neurontin or Flexaril if necessary before surgery, but not both.  ____  Do not wear makeup, including mascara.  ____  No powder, lotions or creams to surgical area.  ____  Please remove all jewelry, including piercings and leave at home.  ____  No money or valuables needed. Please leave at home.  ____  Please bring identification and insurance information to hospital.  ____  If going home the same day, arrange for a ride home. You will not be able to   drive if Anesthesia was used.  ____  Children, under 12 years old, must remain in the waiting room with an adult.  They are not allowed in patient areas.  ____  Wear loose fitting clothing. Allow for dressings, bandages.  ____  Stop Aspirin, Ibuprofen, Motrin and Aleve at least 5-7 days before surgery, unless otherwise instructed by your doctor, or the nurse.   You MAY use Tylenol/acetaminophen until day of surgery.  ____  If you take diabetic medication, do not take am of surgery unless instructed by   Doctor.  ____ Stop taking any Fish Oil supplement or any Vitamins that contain Vitamin E at least 5 days prior to surgery.          Bathing Instructions-- The night before surgery and the morning prior to coming to the hospital:   -Do not shave the surgical area.   -Shower and wash your hair and body as usual with anti-bacterial  soap and shampoo.   -Rinse your hair and body completely.   -Use one packet of hibiclens to wash the surgical site (using your hand) gently for 5 minutes.  Do not scrub you skin  too hard.   -Do not use hibiclens on your head, face, or genitals.   -Do not wash with anti-bacterial soap after you use the hibiclens.   -Rinse your body thoroughly.   -Dry with clean, soft towel.  Do not use lotion, cream, deodorant, or powders on   the surgical site.    Use antibacterial soap in place of hibiclens if your surgery is on the head, face or genitals.         Surgical Site Infection    Prevention of surgical site infections:     -Keep incisions clean and dry.   -Do not soak/submerge incisions in water until completely healed.   -Do not apply lotions, powders, creams, or deodorants to site.   -Always make sure hands are cleaned with antibacterial soap/ alcohol-based   prior to touching the surgical site.  (This includes doctors, nurses, staff, and yourself.)    Signs and symptoms:   -Redness and pain around the area where you had surgery   -Drainage of cloudy fluid from your surgical wound   -Fever over 100.4  I have read or had read and explained to me, and understand the above information.         3

## 2025-06-25 ENCOUNTER — OFFICE VISIT (OUTPATIENT)
Dept: ORTHOPEDICS | Facility: CLINIC | Age: 60
End: 2025-06-25
Payer: MEDICARE

## 2025-06-25 VITALS — HEIGHT: 67 IN | BODY MASS INDEX: 31.55 KG/M2 | WEIGHT: 201 LBS

## 2025-06-25 DIAGNOSIS — M17.12 PRIMARY OSTEOARTHRITIS OF LEFT KNEE: Primary | ICD-10-CM

## 2025-06-25 DIAGNOSIS — M17.11 PRIMARY OSTEOARTHRITIS OF RIGHT KNEE: ICD-10-CM

## 2025-06-25 PROCEDURE — 99213 OFFICE O/P EST LOW 20 MIN: CPT | Mod: 25,S$GLB,, | Performed by: ORTHOPAEDIC SURGERY

## 2025-06-25 PROCEDURE — 1159F MED LIST DOCD IN RCRD: CPT | Mod: CPTII,S$GLB,, | Performed by: ORTHOPAEDIC SURGERY

## 2025-06-25 PROCEDURE — 1160F RVW MEDS BY RX/DR IN RCRD: CPT | Mod: CPTII,S$GLB,, | Performed by: ORTHOPAEDIC SURGERY

## 2025-06-25 PROCEDURE — 3008F BODY MASS INDEX DOCD: CPT | Mod: CPTII,S$GLB,, | Performed by: ORTHOPAEDIC SURGERY

## 2025-06-25 PROCEDURE — 99999 PR PBB SHADOW E&M-NEW PATIENT-LVL III: CPT | Mod: PBBFAC,,, | Performed by: ORTHOPAEDIC SURGERY

## 2025-06-25 PROCEDURE — 20610 DRAIN/INJ JOINT/BURSA W/O US: CPT | Mod: 50,S$GLB,, | Performed by: ORTHOPAEDIC SURGERY

## 2025-06-25 RX ORDER — METHYLPREDNISOLONE ACETATE 40 MG/ML
40 INJECTION, SUSPENSION INTRA-ARTICULAR; INTRALESIONAL; INTRAMUSCULAR; SOFT TISSUE
Status: DISCONTINUED | OUTPATIENT
Start: 2025-06-25 | End: 2025-06-25 | Stop reason: HOSPADM

## 2025-06-25 RX ORDER — PANTOPRAZOLE SODIUM 40 MG/1
40 TABLET, DELAYED RELEASE ORAL
COMMUNITY

## 2025-06-25 RX ORDER — LACTULOSE 10 G/15ML
SOLUTION ORAL; RECTAL
COMMUNITY
Start: 2025-05-02

## 2025-06-25 RX ORDER — ATORVASTATIN CALCIUM 20 MG/1
20 TABLET, FILM COATED ORAL
COMMUNITY

## 2025-06-25 RX ORDER — LINACLOTIDE 72 UG/1
72 CAPSULE, GELATIN COATED ORAL
COMMUNITY
Start: 2025-05-01

## 2025-06-25 RX ORDER — HYDROCODONE BITARTRATE AND ACETAMINOPHEN 10; 325 MG/1; MG/1
TABLET ORAL
COMMUNITY

## 2025-06-25 RX ORDER — FLUTICASONE PROPIONATE 50 MCG
2 SPRAY, SUSPENSION (ML) NASAL
COMMUNITY
Start: 2025-03-18

## 2025-06-25 RX ADMIN — METHYLPREDNISOLONE ACETATE 40 MG: 40 INJECTION, SUSPENSION INTRA-ARTICULAR; INTRALESIONAL; INTRAMUSCULAR; SOFT TISSUE at 10:06

## 2025-06-25 NOTE — PROGRESS NOTES
Josemanuel Alfredo MD  Orthopedic Surgeon   [address] [place] [address]                 Name: Suze Kim  MRN: [id number]  Date: 6/25/2025    Patient ID: Suze Kim is a 59 y.o. female from [place]    Reason for visit:  Bilateral knee pain    Patient Instructions     Encounter Diagnoses   Name Primary?    Primary osteoarthritis of left knee Yes    Primary osteoarthritis of right knee        ASSESSMENT:  Kellgren William grade 2-3 arthritis right knee  Status post arthroscopic surgery bilateral knees 2017 left, 2023 right  Low-grade arthritis left knee.    TREATMENT/PLAN:  40 mg Depo-Medrol +1 cc Marcaine right knee intra-articular injection and 40 mg Depo-Medrol +1 cc Marcaine left knee intra-articular injection  RTO PRN    HISTORY OF PRESENT ILLNESS:   Suze Kim is a 59 y.o. female.Patient presents today follow up of her bilateral knees.  I have seen her and treated her previously at the [facility] [place].  Her last visit with me was June of 2020 4.  She had intra-articular injection in both knees it helped significantly for several months.  She has undergone a cervical neck fusion per Dr. Lamas in December of 2024 in his recovering well.  She uses a cane mostly due to chronic sciatica in her leg.    I performed left knee arthroscopy in 2017 with Dr. Reyes  Arthroscopic surgery of the right knee in 2023 with me.    Latest x-[facility] 2024 [facility] showed Kellgren William grade 2 to early grade 3 changes on the right knee of the left knee show good joint space preservation with Kellgren William grade 0 changes.    She rates her current pain on the left at 8/10 and on the right at 7/10.  Having some popping particularly of the left knee.  No significant  swelling in either knee.  Not using any anti-inflammatory currently.    Objective     XRAY:  None today    PHYSICAL EXAMINATION: Body mass index is 31.48 kg/m².    GENERAL:  pleasant cooperative alert well dressed  female uses cane for ambulatory support    EXTREMITY:   Right knee painful of the medial joint line.  No effusion right knee.  Calf is soft on the right lower extremity with normal pulses and cap refill.  Mild crepitation of the medial joint on the right.    Left knee tender to medial joint line without effusion.  Calf is soft on the left.  She has normal capillary refill and pulses of the left lower extremity.         MEDICATIONS: Current Medications[1]    ALLERGIES:   Review of patient's allergies indicates:   Allergen Reactions    Sulfa (sulfonamide antibiotics) Hives    Tylox [oxycodone-acetaminophen] Hives    Daypro [oxaprozin] Hives       MEDICAL HISTORY:   Past Medical History:   Diagnosis Date    Acute angina     Biotin deficiency disease     Cobalamin deficiency     Coronary arteriosclerosis     DDD (degenerative disc disease)     Deep venous embolism and thrombosis of left lower extremity     DVT (deep venous thrombosis)     Essential hypertension     Fibromyalgia     Foot drop     GERD (gastroesophageal reflux disease)     Hyperlipemia     Hypertension     Irritable bowel syndrome     Mitral valve prolapse     DIOGENES on CPAP     Sciatic nerve injury     Sleep apnea     Spondylolisthesis     Vitamin D deficiency        SURGICAL HISTORY:   Past Surgical History:   Procedure Laterality Date    CARDIAC CATHETERIZATION      CERVICAL FUSION      3-7    COLONOSCOPY      HYSTERECTOMY      KNEE ARTHROSCOPY      LIPOMA RESECTION      myelogram      TUBAL LIGATION         REVIEW OF SYSTEMS:   No fevers, chills, sweats, chest pain or shortness of breath.    SOCIAL HISTORY:   Social History     Occupational History    Occupation: Unemployed   Tobacco Use    Smoking status: Never    Smokeless  tobacco: Never   Substance and Sexual Activity    Alcohol use: No    Drug use: No    Sexual activity: Yes       Patient Type: Established Patient  Visit Type: (CPT 41238 - Estab 20-29 min)     Twenty minute patient encounter  This includes face to face time and non-face to face time preparing to see the patient (eg, review of tests),   obtaining and/or reviewing separately obtained history, documenting clinical information in the electronic or other health record, independently interpreting results   and communicating results to the patient/family/caregiver, or care coordinator.     25 Modifier - Yes.  Significant, separately identifiable evaluation and management service by the same physician on the same day of the procedure or other service. (25)       DISCLAIMER: This note was prepared with [employer]iModel voice recognition transcription software. Garbled syntax, mangled pronouns, and other bizarre constructions may be attributed to that software system.      Josemanuel Alfredo M.D.  Orthopedic Surgeon  [facility]           [1]   Current Outpatient Medications:     atorvastatin (LIPITOR) 20 MG tablet, Take 20 mg by mouth., Disp: , Rfl:     cholecalciferol, vitamin D3, 50,000 unit capsule, TAKE ONE CAPSULE BY MOUTH ONCE A WEEK, Disp: 5 capsule, Rfl: 5    cyclobenzaprine (FLEXERIL) 5 MG tablet, Take 5 mg by mouth 3 (three) times daily as needed., Disp: , Rfl:     fluticasone propionate (FLONASE) 50 mcg/actuation nasal spray, 2 sprays by Each Nostril route., Disp: , Rfl:     gabapentin (NEURONTIN) 300 MG capsule, 300 mg 2 (two) times daily as needed. , Disp: , Rfl:     HYDROcodone-acetaminophen (NORCO)  mg per tablet, Take by mouth., Disp: , Rfl:     lactulose (CHRONULAC) 10 gram/15 mL solution, SMARTSIG:15-30 Milliliter(s) By Mouth Daily, Disp: , Rfl:     lidocaine-prilocaine (EMLA) cream, , Disp: , Rfl:     LINZESS 72 mcg Cap capsule, Take 72 mcg by mouth., Disp: , Rfl:     metoprolol tartrate  (LOPRESSOR) 50 MG tablet, Take 1 tablet (50 mg total) by mouth 2 (two) times daily., Disp: 180 tablet, Rfl: 1    nitroGLYCERIN (NITROSTAT) 0.4 MG SL tablet, Place 0.4 mg under the tongue every 5 (five) minutes as needed., Disp: , Rfl:     pantoprazole (PROTONIX) 40 MG tablet, Take 40 mg by mouth., Disp: , Rfl:

## 2025-06-25 NOTE — PATIENT INSTRUCTIONS
Encounter Diagnoses   Name Primary?    Primary osteoarthritis of left knee Yes    Primary osteoarthritis of right knee        ASSESSMENT:  Kellgren William grade 2-3 arthritis right knee  Status post arthroscopic surgery bilateral knees 2017 left, 2023 right  Low-grade arthritis left knee.    TREATMENT/PLAN:  40 mg Depo-Medrol +1 cc Marcaine right knee intra-articular injection and 40 mg Depo-Medrol +1 cc Marcaine left knee intra-articular injection  RTO PRN

## 2025-06-25 NOTE — PROCEDURES
Large Joint Aspiration/Injection: bilateral knee    Date/Time: 6/25/2025 10:00 AM    Performed by: Josemanuel Alfredo MD  Authorized by: Josemanuel Alfredo MD    Consent Done?:  Yes (Verbal)  Indications:  Pain and arthritis  Timeout: prior to procedure the correct patient, procedure, and site was verified    Prep: patient was prepped and draped in usual sterile fashion      Local anesthesia used?: Yes    Anesthesia:  Local infiltration  Local anesthetic:  Topical anesthetic and bupivacaine 0.5% without epinephrine  Anesthetic total (ml):  1      Details:  Needle Size:  22 G  Ultrasonic Guidance for needle placement?: No    Approach:  Medial  Location:  Knee  Laterality:  Bilateral  Site:  Bilateral knee  Medications (Right):  40 mg methylPREDNISolone acetate 40 mg/mL  Aspirate (Right) amount (mL):  0  Medications (Left):  40 mg methylPREDNISolone acetate 40 mg/mL  Aspirate (Left) amount (mL):  0  Patient tolerance:  Patient tolerated the procedure well with no immediate complications

## (undated) DEVICE — LINER GLOVE POWDERFREE 8

## (undated) DEVICE — APPLICATOR CHLORAPREP ORN 26ML

## (undated) DEVICE — SEE MEDLINE ITEM 157216

## (undated) DEVICE — SUPPORT ULNA NERVE PROTECTOR

## (undated) DEVICE — SEE MEDLINE ITEM 152523

## (undated) DEVICE — MANIFOLD 4 PORT

## (undated) DEVICE — SEE MEDLINE ITEM 152529

## (undated) DEVICE — DRAPE EMERALD 87X114.75X113

## (undated) DEVICE — SEE MEDLINE ITEM 146231

## (undated) DEVICE — ALCOHOL 70% ISOP RUBBING 4OZ

## (undated) DEVICE — SOL IRR NACL .9% 3000ML

## (undated) DEVICE — SYR ONLY LUER LOCK 20CC

## (undated) DEVICE — TUBING FLOSTEADY ARTHROSCOPY

## (undated) DEVICE — PAD CAST SPECIALIST STRL 4

## (undated) DEVICE — SHAVER RESECTOR 4.0

## (undated) DEVICE — SEE MEDLINE ITEM 157117

## (undated) DEVICE — SEE MEDLINE ITEM 146292

## (undated) DEVICE — DECANTER VIAL ASEPTIC TRANSFER

## (undated) DEVICE — SUT ETHILON 3-0 PS2 18 BLK

## (undated) DEVICE — GLOVE SURG BIOGEL LATEX SZ 7.5

## (undated) DEVICE — GAUZE SPONGE 4X4 12PLY

## (undated) DEVICE — UNDERGLOVES BIOGEL PI SIZE 8.5

## (undated) DEVICE — DRAPE PLASTIC U 60X72

## (undated) DEVICE — SYR 10CC LUER LOCK

## (undated) DEVICE — NDL SPINAL 18GX3.5 SPINOCAN

## (undated) DEVICE — SEE MEDLINE ITEM 157027

## (undated) DEVICE — POSITIONER HEAD DONUT 9IN FOAM

## (undated) DEVICE — SCRUB 10% POVIDONE IODINE 4OZ

## (undated) DEVICE — GLOVE SURGICAL LATEX SZ 8